# Patient Record
Sex: FEMALE | Race: WHITE | NOT HISPANIC OR LATINO | Employment: UNEMPLOYED | URBAN - METROPOLITAN AREA
[De-identification: names, ages, dates, MRNs, and addresses within clinical notes are randomized per-mention and may not be internally consistent; named-entity substitution may affect disease eponyms.]

---

## 2017-06-14 ENCOUNTER — OFFICE VISIT (OUTPATIENT)
Dept: MEDICAL GROUP | Facility: PHYSICIAN GROUP | Age: 47
End: 2017-06-14
Payer: COMMERCIAL

## 2017-06-14 VITALS
RESPIRATION RATE: 20 BRPM | OXYGEN SATURATION: 99 % | HEART RATE: 60 BPM | DIASTOLIC BLOOD PRESSURE: 84 MMHG | WEIGHT: 118 LBS | HEIGHT: 65 IN | BODY MASS INDEX: 19.66 KG/M2 | SYSTOLIC BLOOD PRESSURE: 122 MMHG | TEMPERATURE: 97.6 F

## 2017-06-14 DIAGNOSIS — G25.0 BENIGN HEAD TREMOR: ICD-10-CM

## 2017-06-14 DIAGNOSIS — F41.9 ANXIETY: ICD-10-CM

## 2017-06-14 DIAGNOSIS — Z13.220 SCREENING, LIPID: ICD-10-CM

## 2017-06-14 DIAGNOSIS — R51.9 HEADACHE, UNSPECIFIED HEADACHE TYPE: ICD-10-CM

## 2017-06-14 DIAGNOSIS — Z76.89 ENCOUNTER TO ESTABLISH CARE WITH NEW DOCTOR: ICD-10-CM

## 2017-06-14 DIAGNOSIS — R53.83 FATIGUE, UNSPECIFIED TYPE: ICD-10-CM

## 2017-06-14 DIAGNOSIS — Z12.39 BREAST CANCER SCREENING: ICD-10-CM

## 2017-06-14 DIAGNOSIS — A69.20 LYME DISEASE: ICD-10-CM

## 2017-06-14 PROCEDURE — 99204 OFFICE O/P NEW MOD 45 MIN: CPT | Performed by: NURSE PRACTITIONER

## 2017-06-14 ASSESSMENT — PATIENT HEALTH QUESTIONNAIRE - PHQ9: CLINICAL INTERPRETATION OF PHQ2 SCORE: 0

## 2017-06-14 NOTE — Clinical Note
[facility]  IHSAN Cardona.  [address]  [place] [address]  Fax: [phone]   Authorization for Release/Disclosure of   Protected Health Information   Name: KAIA MEREDITH : 1970 SSN: [id number]   Address: [address] [place] [address]   [place] [address] Phone:    [phone] (home)    I authorize the entity listed below to release/disclose the PHI below to:   [facility]/CLARISSE Cardona and CLARISSE Cardona   Provider or Entity Name:     Address   City, State, Zip   Phone:      Fax:     Reason for request: continuity of care   Information to be released:    [  ] LAST COLONOSCOPY,  including any PATH REPORT and follow-up  [  ] LAST FIT/COLOGUARD RESULT [  ] LAST DEXA  [  ] LAST MAMMOGRAM  [  ] LAST PAP  [  ] LAST LABS [  ] RETINA EXAM REPORT  [  ] IMMUNIZATION RECORDS  [x] Release all info      [  ] Check here and initial the line next to each item to release ALL health information INCLUDING  _____ Care and treatment for drug and / or alcohol abuse  _____ HIV testing, infection status, or AIDS  _____ Genetic Testing    DATES OF SERVICE OR TIME PERIOD TO BE DISCLOSED: _____________  I understand and acknowledge that:  * This Authorization may be revoked at any time by you in writing, except if your health information has already been used or disclosed.  * Your health information that will be used or disclosed as a result of you signing this authorization could be re-disclosed by the recipient. If this occurs, your re-disclosed health information may no longer be protected by State or Federal laws.  * You may refuse to sign this Authorization. Your refusal will not affect your ability to obtain treatment.  * This Authorization becomes effective upon signing and will  on (date) __________.      If no date is indicated, this Authorization will  one (1) year from the signature date.    Name: Kaia Meredith    Signature:   Date:     2017       PLEASE FAX  REQUESTED RECORDS BACK TO: [phone]

## 2017-06-14 NOTE — MR AVS SNAPSHOT
"        Kaia Larson   2017 9:00 AM   Office Visit   MRN: [id number]    Department:  [facility]   Dept Phone:  [phone]    Description:  Female : 1970   Provider:  CLARISSE Cardona           Reason for Visit     Establish Care           Allergies as of 2017     No Known Allergies      You were diagnosed with     Encounter to establish care with new doctor   [173640]       Lyme disease   [088.81.ICD-9-CM]       Fatigue, unspecified type   [5941409]       Headache, unspecified headache type   [4630058]       Screening, lipid   [880889]       Breast cancer screening   [005517]       Benign head tremor   [890937]       Anxiety   [205101]         Vital Signs     Blood Pressure Pulse Temperature Respirations Height Weight    122/84 mmHg 60 36.4 °C (97.6 °F) 20 1.651 m (5' 5\") 53.524 kg (118 lb)    Body Mass Index Oxygen Saturation Last Menstrual Period Breastfeeding? Smoking Status       19.64 kg/m2 99% 2017 No Former Smoker       Basic Information     Date Of Birth Sex Race Ethnicity Preferred Language    1970 Female White Non- English      Problem List              ICD-10-CM Priority Class Noted - Resolved    Encounter to establish care with new doctor Z71.89   2017 - Present    Lyme disease A69.20   2017 - Present    Benign head tremor G25.0   2017 - Present    Screening, lipid Z13.220   2017 - Present    Anxiety F41.9   2017 - Present      Health Maintenance        Date Due Completion Dates    IMM DTaP/Tdap/Td Vaccine (1 - Tdap) 1989 ---    PAP SMEAR 1991 ---    MAMMOGRAM 2010 ---            Current Immunizations     No immunizations on file.      Below and/or attached are the medications your provider expects you to take. Review all of your home medications and newly ordered medications with your provider and/or pharmacist. Follow medication instructions as directed by your provider and/or pharmacist. Please keep your " medication list with you and share with your provider. Update the information when medications are discontinued, doses are changed, or new medications (including over-the-counter products) are added; and carry medication information at all times in the event of emergency situations     Allergies:  No Known Allergies          Medications  Valid as of: June 14, 2017 -  9:30 AM    Generic Name Brand Name Tablet Size Instructions for use    .                 Medicines prescribed today were sent to:     [facility] [id number] [place] [address], [place] - [address] [place] [address]    Phone: [phone] Fax: [phone]    Open 24 Hours?: No      Medication refill instructions:       If your prescription bottle indicates you have medication refills left, it is not necessary to call your provider’s office. Please contact your pharmacy and they will refill your medication.    If your prescription bottle indicates you do not have any refills left, you may request refills at any time through one of the following ways: The online [employer] system (except Urgent Care), by calling your provider’s office, or by asking your pharmacy to contact your provider’s office with a refill request. Medication refills are processed only during regular business hours and may not be available until the next business day. Your provider may request additional information or to have a follow-up visit with you prior to refilling your medication.   *Please Note: Medication refills are assigned a new Rx number when refilled electronically. Your pharmacy may indicate that no refills were authorized even though a new prescription for the same medication is available at the pharmacy. Please request the medicine by name with the pharmacy before contacting your provider for a refill.        Your To Do List     Future Labs/Procedures Complete By Expires    [facility] WITH DIFFERENTIAL  As directed 6/14/2018     COMP METABOLIC PANEL  As directed 6/14/2018    FREE THYROXINE  As directed 6/14/2018    LIPID PROFILE  As directed 6/14/2018    LYME WESTERN BLOT IGG + IGM  As directed 6/14/2018    MA-SCREEN MAMMO W/CAD-BILAT  As directed 6/14/2018    TRIIDOTHYRONINE  As directed 6/14/2018    TSH  As directed 6/14/2018    VITAMIN D,25 HYDROXY  As directed 6/14/2018      Referral     A referral request has been sent to our patient care coordination department. Please allow 3-5 business days for us to process this request and contact you either by phone or mail. If you do not hear from us by the 5th business day, please call us at [phone].           Hand Talk Access Code: Activation code not generated  Current Hand Talk Status: Active

## 2017-06-14 NOTE — ASSESSMENT & PLAN NOTE
Has intermittent issues with anxiety.  Has taken low dose zoloft in the past.  Uses deep breathing and mindfulness to control symptoms.

## 2017-06-14 NOTE — ASSESSMENT & PLAN NOTE
Has noticed that she has developed a mild head tremor over the past several years.  She states that it is most noticeable when she is trying to read or focus on something.  She does not recall any family members having a tremor.  It bothers her, especially when other people point it out to her.  Discussed options. She would like to proceed with a referral to neurology for evaluation (and peace of mind).

## 2017-06-14 NOTE — ASSESSMENT & PLAN NOTE
Is here to establish with a new primary care provider.  Was previously seen by a provider in [place].  Moved to [facility] last year.

## 2017-06-14 NOTE — PROGRESS NOTES
Chief Complaint   Patient presents with   • Establish Care       HISTORY OF PRESENT ILLNESS: Patient is a 47 y.o. female new patient who presents today to discuss the following issues:    Encounter to establish care with new doctor  Is here to establish with a new primary care provider.  Was previously seen by a provider in [place].  Moved to [facility] last year.      Anxiety  Has intermittent issues with anxiety.  Has taken low dose zoloft in the past.  Uses deep breathing and mindfulness to control symptoms.    Benign head tremor  Has noticed that she has developed a mild head tremor over the past several years.  She states that it is most noticeable when she is trying to read or focus on something.  She does not recall any family members having a tremor.  It bothers her, especially when other people point it out to her.  Discussed options. She would like to proceed with a referral to neurology for evaluation (and peace of mind).    Lyme disease  Has a history of Lyme Disease.  Had a tick bite on her left upper thigh and developed a rash that spiraled down her leg.  She was treated for it, and has since tested positive 2 more times.  She reports that she has occasional aches in her groin, and occasional headaches.  Otherwise she feels fine.    Breast cancer screening  She is due for a screening mammogram.  She has only had one, and never went back because she has small breasts and she didn't have a good experience.  She was encouraged to follow-through with annual mammograms given her family history (mother) of breast cancer.      Patient Active Problem List    Diagnosis Date Noted   • Encounter to establish care with new doctor 06/14/2017   • Lyme disease 06/14/2017   • Benign head tremor 06/14/2017   • Screening, lipid 06/14/2017   • Anxiety 06/14/2017   • Breast cancer screening 06/14/2017       Allergies:Review of patient's allergies indicates no known allergies.    No current outpatient prescriptions  "on file.     No current facility-administered medications for this visit.       Social History   Substance Use Topics   • Smoking status: Former Smoker   • Smokeless tobacco: Never Used      Comment: social smoker in the 90s   • Alcohol Use: 2.4 oz/week     4 Glasses of wine per week       No family status information on file.     Family History   Problem Relation Age of Onset   • Heart Attack Mother    • Cancer Mother 50     breast   • Diabetes Mother    • Depression Mother        Review of Systems:   Constitutional: Negative for fever, chills, weight loss and malaise/fatigue.   HENT: Negative for ear pain, nosebleeds, congestion, sore throat and neck pain.  Positive for mild, intermittent head tremor.  Eyes: Negative for blurred vision.   Respiratory: Negative for cough, sputum production, shortness of breath and wheezing.    Cardiovascular: Negative for chest pain, palpitations, orthopnea and leg swelling.   Gastrointestinal: Negative for heartburn, nausea, vomiting and abdominal pain.   Genitourinary: Negative for dysuria, urgency and frequency.   Musculoskeletal: Negative for myalgias, joint pain, and back pain.  Positive for occasional groin pain.  Skin: Negative for rash and itching.   Neurological: Negative for dizziness, tingling, tremors, sensory change, and focal weakness.  Positive for occasional headaches.   Endo/Heme/Allergies: Does not bruise/bleed easily.   Psychiatric/Behavioral: Negative for depression, suicidal ideas and memory loss.  Positive for mild anxiety.  All other systems reviewed and are negative except as in HPI.    Exam:  Blood pressure 122/84, pulse 60, temperature 36.4 °C (97.6 °F), resp. rate 20, height 1.651 m (5' 5\"), weight 53.524 kg (118 lb), last menstrual period 06/01/2017, SpO2 99 %, not currently breastfeeding.  General:  Well nourished, well developed female in NAD  Head: Grossly normal.  Neck: Supple without JVD or bruit. Thyroid is not enlarged.  Pulmonary: Clear to " ausculation. Normal effort. No rales, ronchi, or wheezing.  Cardiovascular: Regular rate and rhythm without murmur.   Extremities: No clubbing, cyanosis, or edema.  Skin: Intact with no obvious rashes or lesions.  Neuro: Grossly intact.  Psych: Alert and oriented x 3.  Mood and affect appropriate.    Medical decision-making and discussion: Kaia is here to establish with a new primary care provider.  We reviewed her past medical history and discussed her current medications.  Lab work and screening mammogram were ordered, and a referral was sent to neurology.  She will sign a records release for her previous provider, she will sign up with [facility], and she will plan to follow-up here as needed.         Assessment/Plan:  1. Encounter to establish care with new doctor     2. Lyme disease  LYME WESTERN BLOT IGG + IGM   3. Fatigue, unspecified type  CBC WITH DIFFERENTIAL    COMP METABOLIC PANEL    TSH    VITAMIN D,25 HYDROXY    TRIIDOTHYRONINE    FREE THYROXINE    LYME WESTERN BLOT IGG + IGM   4. Headache, unspecified headache type  CBC WITH DIFFERENTIAL    COMP METABOLIC PANEL    VITAMIN D,25 HYDROXY    LYME WESTERN BLOT IGG + IGM   5. Screening, lipid  LIPID PROFILE   6. Breast cancer screening  MA-SCREEN MAMMO W/CAD-BILAT   7. Benign head tremor  REFERRAL TO NEUROLOGY   8. Anxiety         Return if symptoms worsen or fail to improve.    Please note that this dictation was created using voice recognition software. I have made every reasonable attempt to correct obvious errors, but I expect that there are errors of grammar and possibly content that I did not discover before finalizing the note.

## 2017-06-14 NOTE — ASSESSMENT & PLAN NOTE
She is due for a screening mammogram.  She has only had one, and never went back because she has small breasts and she didn't have a good experience.  She was encouraged to follow-through with annual mammograms given her family history (mother) of breast cancer.

## 2017-06-14 NOTE — ASSESSMENT & PLAN NOTE
Has a history of Lyme Disease.  Had a tick bite on her left upper thigh and developed a rash that spiraled down her leg.  She was treated for it, and has since tested positive 2 more times.  She reports that she has occasional aches in her groin, and occasional headaches.  Otherwise she feels fine.

## 2017-07-03 ENCOUNTER — HOSPITAL ENCOUNTER (OUTPATIENT)
Dept: LAB | Facility: MEDICAL CENTER | Age: 47
End: 2017-07-03
Attending: NURSE PRACTITIONER
Payer: COMMERCIAL

## 2017-07-03 DIAGNOSIS — Z13.220 SCREENING, LIPID: ICD-10-CM

## 2017-07-03 DIAGNOSIS — R51.9 HEADACHE, UNSPECIFIED HEADACHE TYPE: ICD-10-CM

## 2017-07-03 DIAGNOSIS — A69.20 LYME DISEASE: ICD-10-CM

## 2017-07-03 DIAGNOSIS — R53.83 FATIGUE, UNSPECIFIED TYPE: ICD-10-CM

## 2017-07-03 LAB
25(OH)D3 SERPL-MCNC: 40 NG/ML (ref 30–100)
ALBUMIN SERPL BCP-MCNC: 4 G/DL (ref 3.2–4.9)
ALBUMIN/GLOB SERPL: 1.2 G/DL
ALP SERPL-CCNC: 47 U/L (ref 30–99)
ALT SERPL-CCNC: 16 U/L (ref 2–50)
ANION GAP SERPL CALC-SCNC: 6 MMOL/L (ref 0–11.9)
AST SERPL-CCNC: 20 U/L (ref 12–45)
BASOPHILS # BLD AUTO: 0.3 % (ref 0–1.8)
BASOPHILS # BLD: 0.01 K/UL (ref 0–0.12)
BILIRUB SERPL-MCNC: 1 MG/DL (ref 0.1–1.5)
BUN SERPL-MCNC: 20 MG/DL (ref 8–22)
CALCIUM SERPL-MCNC: 9.3 MG/DL (ref 8.5–10.5)
CHLORIDE SERPL-SCNC: 106 MMOL/L (ref 96–112)
CHOLEST SERPL-MCNC: 218 MG/DL (ref 100–199)
CO2 SERPL-SCNC: 27 MMOL/L (ref 20–33)
CREAT SERPL-MCNC: 0.7 MG/DL (ref 0.5–1.4)
EOSINOPHIL # BLD AUTO: 0.07 K/UL (ref 0–0.51)
EOSINOPHIL NFR BLD: 2.4 % (ref 0–6.9)
ERYTHROCYTE [DISTWIDTH] IN BLOOD BY AUTOMATED COUNT: 44.2 FL (ref 35.9–50)
GFR SERPL CREATININE-BSD FRML MDRD: >60 ML/MIN/1.73 M 2
GLOBULIN SER CALC-MCNC: 3.4 G/DL (ref 1.9–3.5)
GLUCOSE SERPL-MCNC: 86 MG/DL (ref 65–99)
HCT VFR BLD AUTO: 41.9 % (ref 37–47)
HDLC SERPL-MCNC: 65 MG/DL
HGB BLD-MCNC: 13.8 G/DL (ref 12–16)
IMM GRANULOCYTES # BLD AUTO: 0 K/UL (ref 0–0.11)
IMM GRANULOCYTES NFR BLD AUTO: 0 % (ref 0–0.9)
LDLC SERPL CALC-MCNC: 138 MG/DL
LYMPHOCYTES # BLD AUTO: 0.87 K/UL (ref 1–4.8)
LYMPHOCYTES NFR BLD: 29.3 % (ref 22–41)
MCH RBC QN AUTO: 30.9 PG (ref 27–33)
MCHC RBC AUTO-ENTMCNC: 32.9 G/DL (ref 33.6–35)
MCV RBC AUTO: 93.7 FL (ref 81.4–97.8)
MONOCYTES # BLD AUTO: 0.3 K/UL (ref 0–0.85)
MONOCYTES NFR BLD AUTO: 10.1 % (ref 0–13.4)
NEUTROPHILS # BLD AUTO: 1.72 K/UL (ref 2–7.15)
NEUTROPHILS NFR BLD: 57.9 % (ref 44–72)
NRBC # BLD AUTO: 0 K/UL
NRBC BLD AUTO-RTO: 0 /100 WBC
PLATELET # BLD AUTO: 197 K/UL (ref 164–446)
PMV BLD AUTO: 13.1 FL (ref 9–12.9)
POTASSIUM SERPL-SCNC: 4.1 MMOL/L (ref 3.6–5.5)
PROT SERPL-MCNC: 7.4 G/DL (ref 6–8.2)
RBC # BLD AUTO: 4.47 M/UL (ref 4.2–5.4)
SODIUM SERPL-SCNC: 139 MMOL/L (ref 135–145)
T3 SERPL-MCNC: 94.2 NG/DL (ref 60–181)
T4 FREE SERPL-MCNC: 0.82 NG/DL (ref 0.53–1.43)
TRIGL SERPL-MCNC: 75 MG/DL (ref 0–149)
TSH SERPL DL<=0.005 MIU/L-ACNC: 3.76 UIU/ML (ref 0.3–3.7)
WBC # BLD AUTO: 3 K/UL (ref 4.8–10.8)

## 2017-07-03 PROCEDURE — 84443 ASSAY THYROID STIM HORMONE: CPT

## 2017-07-03 PROCEDURE — 82306 VITAMIN D 25 HYDROXY: CPT

## 2017-07-03 PROCEDURE — 86617 LYME DISEASE ANTIBODY: CPT | Mod: 91

## 2017-07-03 PROCEDURE — 80053 COMPREHEN METABOLIC PANEL: CPT

## 2017-07-03 PROCEDURE — 85025 COMPLETE CBC W/AUTO DIFF WBC: CPT

## 2017-07-03 PROCEDURE — 84439 ASSAY OF FREE THYROXINE: CPT

## 2017-07-03 PROCEDURE — 80061 LIPID PANEL: CPT

## 2017-07-03 PROCEDURE — 36415 COLL VENOUS BLD VENIPUNCTURE: CPT

## 2017-07-03 PROCEDURE — 84480 ASSAY TRIIODOTHYRONINE (T3): CPT

## 2017-07-06 LAB
B BURGDOR IGG SER QL IB: POSITIVE
B BURGDOR IGM SER QL IB: POSITIVE

## 2017-10-17 ENCOUNTER — APPOINTMENT (OUTPATIENT)
Dept: NEUROLOGY | Facility: MEDICAL CENTER | Age: 47
End: 2017-10-17
Payer: COMMERCIAL

## 2018-02-01 ENCOUNTER — OFFICE VISIT (OUTPATIENT)
Dept: NEUROLOGY | Facility: MEDICAL CENTER | Age: 48
End: 2018-02-01
Payer: COMMERCIAL

## 2018-02-01 VITALS
OXYGEN SATURATION: 95 % | HEART RATE: 108 BPM | TEMPERATURE: 98.5 F | RESPIRATION RATE: 16 BRPM | WEIGHT: 111.8 LBS | HEIGHT: 65 IN | BODY MASS INDEX: 18.63 KG/M2 | SYSTOLIC BLOOD PRESSURE: 90 MMHG | DIASTOLIC BLOOD PRESSURE: 62 MMHG

## 2018-02-01 DIAGNOSIS — G25.0 FAMILIAL TREMOR: Primary | ICD-10-CM

## 2018-02-01 PROCEDURE — 99205 OFFICE O/P NEW HI 60 MIN: CPT | Performed by: PSYCHIATRY & NEUROLOGY

## 2018-02-01 RX ORDER — IBUPROFEN 200 MG
200 TABLET ORAL EVERY 6 HOURS PRN
COMMUNITY
End: 2022-10-27

## 2018-02-01 ASSESSMENT — ENCOUNTER SYMPTOMS
TREMORS: 1
VOMITING: 0
CONSTIPATION: 0
FOCAL WEAKNESS: 0
NAUSEA: 0
FALLS: 0
MEMORY LOSS: 1
LOSS OF CONSCIOUSNESS: 0
DEPRESSION: 0
DOUBLE VISION: 0

## 2018-02-01 ASSESSMENT — PAIN SCALES - GENERAL: PAINLEVEL: 3=SLIGHT PAIN

## 2018-02-01 NOTE — PROGRESS NOTES
Subjective:      Kaia Larson is a 48 y.o. female who presents for consultation from the office of KARLA Fields, with a history of tremor.    CONNIE Marti is a pleasant 48-year-old left-handed  female who began to notice tremulousness of the head and neck beginning about 2-3 years ago, and which has slowly changed over time, symptoms becoming a bit more prominent. She denies any involvement of the voice, hands, or an internalized sense of tremulousness.    It is a very fine tremulousness, side to side as well as up and down, not necessarily apparent to others. When she holds the head and neck in static position such as when reading, as well as when she is more anxious or stressed, fatigue, etc., she is more likely to notice it. Relaxation always helps. Caffeine clearly makes things worse. She is not sure if alcohol has any effect one way or the other.    As above, she has not recognized any tremulousness with the hands. She certainly denies loss of voice volume and quality, anosmia, difficulty swallowing or excessive drooling, loss of dexterity with the hands, weakness, balance difficulties with frequent falls, excessive constipation, syncope or loss of appetite with early satiety and nausea, etc.    There has been no directed workup, she has not been treated for the tremulousness.    She does describe some memory problems especially with word finding, but even recalling details of movies initially, though she can recall the machines. 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    She has a history of chronic Lyme disease, anxiety with panic disorder, no history of diabetes, MS, seizure, migraine, neurodegenerative disease, liver or kidney disease, anemia, blood dyscrasia, autoimmune disease, thyroid disease, CVA, CAD, hypertension or PVD. She is status post ASD correction at 4 years of age.  "There is no other surgical history of note. Her last menses was 5 months ago. Both her mother and maternal grandmother suffered from tremor, her mother in the hands, her maternal grandmother in her head. Her mother also has heart disease, bipolar disease and symptomatic seizures. Her father passed recently from heart disease and complications of prostate cancer. Her 2 brothers are alive and well as are her 5 children, ages 5 through 16. She drinks occasionally, does not smoke cigarettes. She has her own social media management business, her  is a  working for a software copy. Other than ibuprofen, she takes no other medications.    Review of Systems   Constitutional: Negative for malaise/fatigue.   Eyes: Negative for double vision.   Gastrointestinal: Negative for constipation, nausea and vomiting.   Genitourinary: Negative for dysuria.   Musculoskeletal: Negative for falls.   Neurological: Positive for tremors. Negative for focal weakness and loss of consciousness.   Psychiatric/Behavioral: Positive for memory loss. Negative for depression.   All other systems reviewed and are negative.       Objective:     BP (!) 90/62   Pulse (!) 108   Temp 36.9 °C (98.5 °F)   Resp 16   Ht 1.651 m (5' 5\")   Wt 50.7 kg (111 lb 12.8 oz)   SpO2 95%   BMI 18.60 kg/m²      Physical Exam    She appears in no acute distress. Vital signs are stable though her blood pressure is a little on the low side at 90/62, her pulse slightly elevated 108, the rhythm is regular. There was no malar rash, sialorrhea, she is not drooling excessively. The neck is supple, range of motion is full. A tremulousness is seen when she sits erect, involving the head and a side to side motion. Cardiac evaluation reveals a regular rhythm. There is no lower extremity edema.    She is fully oriented, there is no aphasia or inattention.    PERRLA/EOMI, glabellar tap is absent, visual fields are full to movement detection, there is no " hypophonia or bradykinesia, facial movements are symmetric, there is no sensory loss across the midline to temperature and light touch. There is no bulbar dysfunction, voice volume and quality are normal without tremulousness, shoulder shrug and head rotation are intact.    Motor exam does reveal a very fine minimal tremulousness in the hands with posture against gravity only, there is no asterixis or drift. Strength is 5/5 throughout. Tone is normal with distraction. Reflexes are brisk and present at all points without asymmetries, none are dropped, both toes are downgoing.    She walks with normal station, arm swing is symmetric, heel, toe, and tandem walking are intact. Arm swing is symmetric. There is no appendicular dystaxia, fine tremulousness with the right upper extremity seen through a full range of motion. Repetitive movements with the hands, fingers and feet are intact and symmetric with normal and maintained amplitudes and frequencies.    Sensory exam is intact to temperature and vibration, Romberg is absent.     Assessment/Plan:     1. Familial tremor  I suspect that this is the diagnosis, a benign process, she certainly does not have the symptoms by history nor the abnormalities on examination to suggest Parkinson's disease or one of its relatives. This is not thyroid disease, anemia, or other symptomatic tremulousness related to toxic metabolic derangements, liver or kidney disease, etc. I don't believe that imaging studies or EEG studies are indicated. To help confirm the diagnosis, I did ask her to have a drink on a couple of different occasions to see if in fact there is improvement consistently. If so, this will confirm the diagnosis, though this will show up in only about 30% of patients.    The nature of familial, benign tremor was reviewed. There may be some anticipation given that both her mother and maternal grandmother have the condition though they started when they were older, hers is a  much earlier onset. A little unusual is the fact that it is a head and neck issue more than an appendicular involvement, but still this is not unheard of. Fortunately this is a condition that will progress only slowly over many years and decades. Symptomatic relief can be offered when needed, but for now she seems to be doing well without the need.    As for memory loss, though I doubt this is related to her chronic Lyme disease, we also talked about signs and symptoms suggestive of dementia and what to watch for. Again I think it unlikely for her given her young age without a family history. Some of the word finding problems she describes are likely benign, she denies anything else that suggests a progressive cognitive disorder.    Medications that can be used would include beta blockers, Mysoline, carbonic anhydrase inhibitors and gabapentin. We will cross that bridge when we get there. For now a follow-up appointment will be scheduled as needed, she was offered an annual appointment.    Time: Evaluation 60 minutes for exam come review, discussion, and education  Discussion: As mentioned in the assessment, over 60% of the time spent face-to-face counseling and coordinating care

## 2018-11-06 ENCOUNTER — HOSPITAL ENCOUNTER (OUTPATIENT)
Facility: MEDICAL CENTER | Age: 48
End: 2018-11-06
Attending: PHYSICIAN ASSISTANT
Payer: COMMERCIAL

## 2018-11-06 ENCOUNTER — OFFICE VISIT (OUTPATIENT)
Dept: URGENT CARE | Facility: PHYSICIAN GROUP | Age: 48
End: 2018-11-06
Payer: COMMERCIAL

## 2018-11-06 VITALS
RESPIRATION RATE: 12 BRPM | DIASTOLIC BLOOD PRESSURE: 68 MMHG | SYSTOLIC BLOOD PRESSURE: 102 MMHG | TEMPERATURE: 97.9 F | WEIGHT: 110 LBS | BODY MASS INDEX: 18.33 KG/M2 | OXYGEN SATURATION: 98 % | HEIGHT: 65 IN | HEART RATE: 69 BPM

## 2018-11-06 DIAGNOSIS — R31.29 HEMATURIA, MICROSCOPIC: ICD-10-CM

## 2018-11-06 DIAGNOSIS — N89.8 VAGINAL DISCHARGE: ICD-10-CM

## 2018-11-06 DIAGNOSIS — N89.8 VAGINAL ITCHING: ICD-10-CM

## 2018-11-06 LAB
APPEARANCE UR: NORMAL
BILIRUB UR STRIP-MCNC: NORMAL MG/DL
COLOR UR AUTO: NORMAL
GLUCOSE UR STRIP.AUTO-MCNC: NORMAL MG/DL
KETONES UR STRIP.AUTO-MCNC: NORMAL MG/DL
LEUKOCYTE ESTERASE UR QL STRIP.AUTO: NORMAL
NITRITE UR QL STRIP.AUTO: NORMAL
PH UR STRIP.AUTO: 7 [PH] (ref 5–8)
PROT UR QL STRIP: NORMAL MG/DL
RBC UR QL AUTO: NORMAL
SP GR UR STRIP.AUTO: 1.02
UROBILINOGEN UR STRIP-MCNC: 1 MG/DL

## 2018-11-06 PROCEDURE — 87591 N.GONORRHOEAE DNA AMP PROB: CPT

## 2018-11-06 PROCEDURE — 87480 CANDIDA DNA DIR PROBE: CPT

## 2018-11-06 PROCEDURE — 87510 GARDNER VAG DNA DIR PROBE: CPT

## 2018-11-06 PROCEDURE — 81002 URINALYSIS NONAUTO W/O SCOPE: CPT | Performed by: PHYSICIAN ASSISTANT

## 2018-11-06 PROCEDURE — 87660 TRICHOMONAS VAGIN DIR PROBE: CPT

## 2018-11-06 PROCEDURE — 99203 OFFICE O/P NEW LOW 30 MIN: CPT | Performed by: PHYSICIAN ASSISTANT

## 2018-11-06 PROCEDURE — 87491 CHLMYD TRACH DNA AMP PROBE: CPT

## 2018-11-06 ASSESSMENT — ENCOUNTER SYMPTOMS
CHILLS: 0
FLANK PAIN: 1
NAUSEA: 0
ABDOMINAL PAIN: 0
VAGINITIS: 1

## 2018-11-06 NOTE — PROGRESS NOTES
"Subjective:     Kaia Larson is a 48 y.o. female who presents for Vaginal Discharge (x 8 days )       Vaginitis   The patient's primary symptoms include genital itching and vaginal discharge. The patient's pertinent negatives include no genital odor or vaginal bleeding. Primary symptoms comment: denies hematuria. This is a new problem. The current episode started in the past 7 days. The problem occurs constantly. The problem has been unchanged. The patient is experiencing no pain. The problem affects both sides. She is not pregnant. Associated symptoms include flank pain. Pertinent negatives include no abdominal pain, chills, nausea or rash. The vaginal discharge was copious. There has been no bleeding. She has not been passing clots. Nothing aggravates the symptoms. Treatments tried: OTC yeast infection tx (1 day) The treatment provided no relief. She is sexually active (Pt has sex for the first time in several months the day prior to sx onset. monogamous relationship with .). No, her partner does not have an STD. She uses nothing for contraception. She is postmenopausal. (Genital warts as a teenager. )   LMP:2017    Review of Systems   Constitutional: Negative for chills.   Gastrointestinal: Negative for abdominal pain and nausea.   Genitourinary: Positive for flank pain and vaginal discharge.   Skin: Negative for rash.   All other systems reviewed and are negative.  No Known Allergies   Objective:   /68 (BP Location: Right arm, Patient Position: Sitting, BP Cuff Size: Adult)   Pulse 69   Temp 36.6 °C (97.9 °F)   Resp 12   Ht 1.651 m (5' 5\")   Wt 49.9 kg (110 lb)   LMP 2017   SpO2 98%   BMI 18.30 kg/m²   Physical Exam   Genitourinary:       Pelvic exam was performed with patient supine. There is no rash, tenderness, lesion or injury on the right labia. There is no rash, tenderness, lesion or injury on the left labia. Cervix exhibits no motion tenderness, no discharge and no " friability. No erythema, tenderness or bleeding in the vagina. No signs of injury around the vagina. Vaginal discharge found.   Genitourinary Comments: Bimanual exam not performed, as pt has well woman exam scheduled on Friday.        Assessment/Plan:   1. Hematuria, microscopic  - URINALYSIS; Standing    2. Vaginal discharge  - CHLAMYDIA/GC PCR URINE OR SWAB; Standing  - ALLERGEN, TRICHOPHYTON IGG, FUNGI/MOLD; Standing  - CHLAMYDIA/GC PCR URINE OR SWAB  - ALLERGEN, TRICHOPHYTON IGG, FUNGI/MOLD    Suspicious for yeast infection or BV. We will contact patient with results upon receipt and treat as indicated.    Differential diagnosis, natural history, supportive care, and indications for immediate follow-up discussed.    Pt contacted the clinic 11.7.18 due to extreme discomfort. I returned call at 1531. Pt presentation most consistent with a yeast infection. R/B of treating clinically discussed with pt and she consented to treatment.  I will Rx diflucan and adjust treatment, as required upon receipt of lab results.  Pt given return precautions.    I have reviewed and agree with history, assessment and plan for office encounter on 11/6/2018 with midlevel provider: Radha.  Suggested changes to plan or follow-up: none   Vitaly Lord M.D.

## 2018-11-07 ENCOUNTER — TELEPHONE (OUTPATIENT)
Dept: URGENT CARE | Facility: PHYSICIAN GROUP | Age: 48
End: 2018-11-07

## 2018-11-07 DIAGNOSIS — N89.8 VAGINAL DISCHARGE: ICD-10-CM

## 2018-11-07 LAB
CANDIDA DNA VAG QL PROBE+SIG AMP: NEGATIVE
G VAGINALIS DNA VAG QL PROBE+SIG AMP: NEGATIVE
T VAGINALIS DNA VAG QL PROBE+SIG AMP: NEGATIVE

## 2018-11-07 RX ORDER — FLUCONAZOLE 150 MG/1
150 TABLET ORAL DAILY
Qty: 1 TAB | Refills: 1 | Status: SHIPPED | OUTPATIENT
Start: 2018-11-07 | End: 2018-11-08

## 2018-11-09 ENCOUNTER — HOSPITAL ENCOUNTER (OUTPATIENT)
Facility: MEDICAL CENTER | Age: 48
End: 2018-11-09
Attending: FAMILY MEDICINE
Payer: COMMERCIAL

## 2018-11-09 ENCOUNTER — OFFICE VISIT (OUTPATIENT)
Dept: MEDICAL GROUP | Facility: PHYSICIAN GROUP | Age: 48
End: 2018-11-09
Payer: COMMERCIAL

## 2018-11-09 VITALS — RESPIRATION RATE: 12 BRPM | SYSTOLIC BLOOD PRESSURE: 112 MMHG | DIASTOLIC BLOOD PRESSURE: 68 MMHG | HEART RATE: 68 BPM

## 2018-11-09 DIAGNOSIS — Z12.39 SCREENING FOR BREAST CANCER: ICD-10-CM

## 2018-11-09 DIAGNOSIS — Z12.4 SCREENING FOR CERVICAL CANCER: ICD-10-CM

## 2018-11-09 DIAGNOSIS — N89.8 VAGINAL DISCHARGE: ICD-10-CM

## 2018-11-09 LAB
C TRACH DNA SPEC QL NAA+PROBE: NEGATIVE
N GONORRHOEA DNA SPEC QL NAA+PROBE: NEGATIVE
SPECIMEN SOURCE: NORMAL

## 2018-11-09 PROCEDURE — 99214 OFFICE O/P EST MOD 30 MIN: CPT | Performed by: FAMILY MEDICINE

## 2018-11-09 PROCEDURE — 87624 HPV HI-RISK TYP POOLED RSLT: CPT

## 2018-11-09 PROCEDURE — 88175 CYTOPATH C/V AUTO FLUID REDO: CPT

## 2018-11-09 RX ORDER — METRONIDAZOLE 7.5 MG/G
1 GEL VAGINAL
Qty: 1 TUBE | Refills: 1 | Status: SHIPPED | OUTPATIENT
Start: 2018-11-09 | End: 2018-11-14

## 2018-11-09 NOTE — PROGRESS NOTES
Chief Complaint   Patient presents with   • Vaginal Discharge     x 2 weeks       HISTORY OF PRESENT ILLNESS: Patient is a 48 y.o. female established patient here today for the following concerns:    1. Vaginal discharge  Here for 2 weeks of vaginal discharge that initially she mistook for urine.  However found that it had been worsening and required tampon to help with managing the discharge.  She reports it is tender, itching and burning in nature.  Had hx of HSV in teens and had hx of genital warts but has not had any exacerbations in decades.    No fevers, chills. No flank pains.           Past Medical, Social, and Family history reviewed and updated in EPIC    Allergies:Patient has no known allergies.    Current Outpatient Prescriptions   Medication Sig Dispense Refill   • metroNIDAZOLE (METROGEL-VAGINAL) 0.75 % Gel Insert 1 Applicator in vagina every bedtime for 5 days. 1 Tube 1   • ibuprofen (MOTRIN) 200 MG Tab Take 200 mg by mouth every 6 hours as needed.       No current facility-administered medications for this visit.          ROS:  Review of Systems   Constitutional: Negative for fever, chills, weight loss and malaise/fatigue.   HENT: Negative for ear pain, nosebleeds, congestion, sore throat and neck pain.    Eyes: Negative for blurred vision.   Respiratory: Negative for cough, sputum production, shortness of breath and wheezing.    Cardiovascular: Negative for chest pain, palpitations,  and leg swelling.   Gastrointestinal: Negative for heartburn, nausea, vomiting, diarrhea and abdominal pain.   Genitourinary: Negative for dysuria, urgency and frequency.   Musculoskeletal: Negative for myalgias, back pain and joint pain.   Skin: Negative for rash and itching.   Neurological: Negative for dizziness, tingling, tremors, sensory change, focal weakness and headaches.   Endo/Heme/Allergies: Does not bruise/bleed easily.   Psychiatric/Behavioral: Negative for depression, anxiety, suicidal ideas, insomnia and  memory loss.      Exam:  Blood pressure 112/68, pulse 68, resp. rate 12, last menstrual period 06/01/2017, not currently breastfeeding.    General:  Well nourished, well developed in NAD  Head is grossly normal.  Neck: Supple without JVD   Pulmonary:  Normal effort.   Cardiovascular: Regular rate  Extremities: no clubbing, cyanosis, or edema.  Psych: affect appropriate  : no external lesions, Speculum exam shows diffuse erythema of the vaginal mucosa and copious clear/yellow discharge. Cervix with possible small ulceration seen.   Pap is obtained.      Review of most recent vaginal pathogen testing.     Please note that this dictation was created using voice recognition software. I have made every reasonable attempt to correct obvious errors, but I expect that there are errors of grammar and possibly content that I did not discover before finalizing the note.    Assessment/Plan:  1. Vaginal discharge  Suspect that this may be BV given hx and exam will empirically treat with metrogel.  If not improving, query if there is more of a autoimmune component.  She definitely has hx of menopausal vaginal dryness and could benefit from vaginal estrogen when healed.   - metroNIDAZOLE (METROGEL-VAGINAL) 0.75 % Gel; Insert 1 Applicator in vagina every bedtime for 5 days.  Dispense: 1 Tube; Refill: 1    2. Screening for cervical cancer  Check   - THINPREP PAP WITH HPV; Future    3. Screening for breast cancer  Due   - MA-SCREENING MAMMO BILAT W/TOMOSYNTHESIS W/CAD; Future

## 2018-11-13 ENCOUNTER — TELEPHONE (OUTPATIENT)
Dept: MEDICAL GROUP | Facility: PHYSICIAN GROUP | Age: 48
End: 2018-11-13

## 2018-11-13 PROBLEM — Z76.89 ENCOUNTER TO ESTABLISH CARE WITH NEW DOCTOR: Status: RESOLVED | Noted: 2017-06-14 | Resolved: 2018-11-13

## 2018-11-13 PROBLEM — Z13.220 SCREENING, LIPID: Status: RESOLVED | Noted: 2017-06-14 | Resolved: 2018-11-13

## 2018-11-13 PROBLEM — Z12.39 BREAST CANCER SCREENING: Status: RESOLVED | Noted: 2017-06-14 | Resolved: 2018-11-13

## 2018-11-13 LAB
CYTOLOGY REG CYTOL: NORMAL
HPV HR 12 DNA CVX QL NAA+PROBE: NEGATIVE
HPV16 DNA SPEC QL NAA+PROBE: NEGATIVE
HPV18 DNA SPEC QL NAA+PROBE: NEGATIVE
SPECIMEN SOURCE: NORMAL

## 2018-11-13 NOTE — TELEPHONE ENCOUNTER
1. Caller Name: Kaia                                          Call Back Number: [phone] (home)        Patient approves a detailed voicemail message: N\A    pt called stating that her vaginal symptoms are not improving at all. Pt states she does have a [facility] appt scheduled but would like to know if you would like to see her sooner. Please advise

## 2018-11-14 NOTE — TELEPHONE ENCOUNTER
If she continues to have symptoms, she probably doesn't want to wait until December 5th.  I know I am pretty booked, but she can go back to urgent care or she can see another [facility] provider.  Thanks.

## 2018-12-04 ENCOUNTER — HOSPITAL ENCOUNTER (OUTPATIENT)
Dept: RADIOLOGY | Facility: MEDICAL CENTER | Age: 48
End: 2018-12-04
Attending: FAMILY MEDICINE
Payer: COMMERCIAL

## 2018-12-04 DIAGNOSIS — Z12.39 SCREENING FOR BREAST CANCER: ICD-10-CM

## 2018-12-04 PROCEDURE — 77067 SCR MAMMO BI INCL CAD: CPT

## 2019-01-03 ENCOUNTER — HOSPITAL ENCOUNTER (OUTPATIENT)
Dept: LAB | Facility: MEDICAL CENTER | Age: 49
End: 2019-01-03
Attending: OBSTETRICS & GYNECOLOGY
Payer: COMMERCIAL

## 2019-01-03 PROCEDURE — 87077 CULTURE AEROBIC IDENTIFY: CPT

## 2019-01-03 PROCEDURE — 81001 URINALYSIS AUTO W/SCOPE: CPT

## 2019-01-03 PROCEDURE — 87086 URINE CULTURE/COLONY COUNT: CPT

## 2019-01-04 LAB
APPEARANCE UR: ABNORMAL
BACTERIA #/AREA URNS HPF: NEGATIVE /HPF
BILIRUB UR QL STRIP.AUTO: NEGATIVE
COLOR UR: YELLOW
EPI CELLS #/AREA URNS HPF: ABNORMAL /HPF
GLUCOSE UR STRIP.AUTO-MCNC: NEGATIVE MG/DL
HYALINE CASTS #/AREA URNS LPF: ABNORMAL /LPF
KETONES UR STRIP.AUTO-MCNC: NEGATIVE MG/DL
LEUKOCYTE ESTERASE UR QL STRIP.AUTO: NEGATIVE
MICRO URNS: ABNORMAL
NITRITE UR QL STRIP.AUTO: NEGATIVE
PH UR STRIP.AUTO: 5.5 [PH]
PROT UR QL STRIP: NEGATIVE MG/DL
RBC # URNS HPF: ABNORMAL /HPF
RBC UR QL AUTO: NEGATIVE
SP GR UR STRIP.AUTO: 1.02
UROBILINOGEN UR STRIP.AUTO-MCNC: 0.2 MG/DL
WBC #/AREA URNS HPF: ABNORMAL /HPF

## 2019-01-06 LAB
BACTERIA UR CULT: ABNORMAL
BACTERIA UR CULT: ABNORMAL
SIGNIFICANT IND 70042: ABNORMAL
SITE SITE: ABNORMAL
SOURCE SOURCE: ABNORMAL

## 2021-04-27 ENCOUNTER — IMMUNIZATION (OUTPATIENT)
Dept: FAMILY PLANNING/WOMEN'S HEALTH CLINIC | Facility: IMMUNIZATION CENTER | Age: 51
End: 2021-04-27
Payer: COMMERCIAL

## 2021-04-27 DIAGNOSIS — Z23 ENCOUNTER FOR VACCINATION: Primary | ICD-10-CM

## 2021-04-27 PROCEDURE — 0001A PFIZER SARS-COV-2 VACCINE: CPT

## 2021-04-27 PROCEDURE — 91300 PFIZER SARS-COV-2 VACCINE: CPT

## 2021-05-27 ENCOUNTER — IMMUNIZATION (OUTPATIENT)
Dept: FAMILY PLANNING/WOMEN'S HEALTH CLINIC | Facility: IMMUNIZATION CENTER | Age: 51
End: 2021-05-27
Payer: COMMERCIAL

## 2021-05-27 DIAGNOSIS — Z23 ENCOUNTER FOR VACCINATION: Primary | ICD-10-CM

## 2021-05-27 PROCEDURE — 0002A PFIZER SARS-COV-2 VACCINE: CPT

## 2021-05-27 PROCEDURE — 91300 PFIZER SARS-COV-2 VACCINE: CPT

## 2022-09-26 ENCOUNTER — OFFICE VISIT (OUTPATIENT)
Dept: MEDICAL GROUP | Facility: PHYSICIAN GROUP | Age: 52
End: 2022-09-26
Payer: COMMERCIAL

## 2022-09-26 VITALS
HEART RATE: 66 BPM | BODY MASS INDEX: 17.68 KG/M2 | RESPIRATION RATE: 16 BRPM | OXYGEN SATURATION: 100 % | DIASTOLIC BLOOD PRESSURE: 72 MMHG | HEIGHT: 66 IN | TEMPERATURE: 98.9 F | WEIGHT: 110 LBS | SYSTOLIC BLOOD PRESSURE: 114 MMHG

## 2022-09-26 DIAGNOSIS — E78.2 MIXED HYPERLIPIDEMIA: ICD-10-CM

## 2022-09-26 DIAGNOSIS — F33.41 RECURRENT MAJOR DEPRESSIVE DISORDER, IN PARTIAL REMISSION (HCC): ICD-10-CM

## 2022-09-26 DIAGNOSIS — Z23 NEED FOR VACCINATION: ICD-10-CM

## 2022-09-26 DIAGNOSIS — M25.541 ARTHRALGIA OF BOTH HANDS: ICD-10-CM

## 2022-09-26 DIAGNOSIS — M25.542 ARTHRALGIA OF BOTH HANDS: ICD-10-CM

## 2022-09-26 DIAGNOSIS — E03.9 HYPOTHYROIDISM, UNSPECIFIED TYPE: ICD-10-CM

## 2022-09-26 DIAGNOSIS — Z12.31 ENCOUNTER FOR SCREENING MAMMOGRAM FOR MALIGNANT NEOPLASM OF BREAST: ICD-10-CM

## 2022-09-26 DIAGNOSIS — Z12.11 COLON CANCER SCREENING: ICD-10-CM

## 2022-09-26 DIAGNOSIS — Z00.00 ENCOUNTER FOR MEDICAL EXAMINATION TO ESTABLISH CARE: ICD-10-CM

## 2022-09-26 PROBLEM — K90.49 DAIRY PRODUCT INTOLERANCE: Status: ACTIVE | Noted: 2022-09-26

## 2022-09-26 PROCEDURE — 90472 IMMUNIZATION ADMIN EACH ADD: CPT

## 2022-09-26 PROCEDURE — 90686 IIV4 VACC NO PRSV 0.5 ML IM: CPT

## 2022-09-26 PROCEDURE — 99204 OFFICE O/P NEW MOD 45 MIN: CPT | Mod: 25

## 2022-09-26 PROCEDURE — 90715 TDAP VACCINE 7 YRS/> IM: CPT

## 2022-09-26 PROCEDURE — 90471 IMMUNIZATION ADMIN: CPT

## 2022-09-26 ASSESSMENT — PATIENT HEALTH QUESTIONNAIRE - PHQ9: CLINICAL INTERPRETATION OF PHQ2 SCORE: 0

## 2022-09-26 NOTE — ASSESSMENT & PLAN NOTE
Patient reports her depression first started about 20 years ago. She was put on Zoloft but discontinued this after learning how to talk herself down. She not goes through situational anxiety. A few days every couple of months, she gets feelings of wanting to run away and not come back.

## 2022-09-26 NOTE — ASSESSMENT & PLAN NOTE
Patient report ssolga was first diagnosed in 2014 and treated the issue fairly immediately. She has had about 3 different cycles of symptoms. She has symptoms intermittently of fogginess, fatigue and joint pain. She reports that her joint pain is primarily in her hands and sometimes her knees and toes. The pain causes her an issue with gripping things.

## 2022-09-26 NOTE — PROGRESS NOTES
CC:    Chief Complaint   Patient presents with    [facility]    Follow-Up     arthritis        HISTORY OF THE PRESENT ILLNESS: This is a pleasant 52 y.o. female presenting today to [facility], who wishes to discuss the following problems listed below:     Lyme disease  Patient report janice was first diagnosed in 2014 and treated the issue fairly immediately. She has had about 3 different cycles of symptoms. She has symptoms intermittently of fogginess, fatigue and joint pain. She reports that her joint pain is primarily in her hands and sometimes her knees and toes. The pain causes her an issue with gripping things.     Dairy product intolerance  Patient reports she gets an upset stomach intermittently when ingesting dairy products.     Recurrent major depressive disorder, in partial remission (HCC)  Patient reports her depression first started about 20 years ago. She was put on Zoloft but discontinued this after learning how to talk herself down. She not goes through situational anxiety. A few days every couple of months, she gets feelings of wanting to run away and not come back.       Past Medical History:   Diagnosis Date    Familial tremor 6/14/2017    Lyme disease     Status post device closure of ASD        Allergies: Patient has no known allergies.    Current Outpatient Medications   Medication Sig Dispense Refill    ibuprofen (MOTRIN) 200 MG Tab Take 200 mg by mouth every 6 hours as needed.       No current facility-administered medications for this visit.       ROS:     Constitutional: Patient denies any fever, chills, night sweats, weight loss/gain, fatigue, or malaise.   Eyes: Patient denies any photophobia, eye pain, diplopia, discharge, dryness, excessive tearing, redness, or VA changes.   ENT: Patient denies any runny nose, hoarseness, sore throat, or dysphagia.   Resp: Patient denies cough, wheezing, or shortness of breath.   CV: Patient denies any chest pain, claudication, cyanosis,  "palpitations, or edema.   GI: Patient denies any constipation, nausea, vomiting, diarrhea, bloating, abdominal pain, or dyspepsia.   MSK: Patient with + joint pain, but no cramps, swelling, weakness, + stiffness, and tremor  Skin: Patient denies any color changes, skin changes, lesions, ulcerations, wounds, and pruritus, hair or nail changes.   Neuro: Patient denies any headache, numbness or tingling  Psych: Patient denies any suicidal or homicidal ideation, depression or anxiety.     Exam: /72 (BP Location: Right arm, Patient Position: Sitting, BP Cuff Size: Adult)   Pulse 66   Temp 37.2 °C (98.9 °F) (Temporal)   Resp 16   Ht 1.664 m (5' 5.5\")   Wt 49.9 kg (110 lb)   SpO2 100%  Body mass index is 18.03 kg/m².      Gen: Alert and oriented x4. Well developed, well-nourished female in no apparent distress.  Skin: Warm, dry, good turgor, no rashes in visible areas or lacerations appreciated.   Eye: EOM intact, pupils equal, round and reactive, conjunctiva clear, lids normal.  Neck: Trachea midline, no masses, no thyromegaly  Lungs: Normal effort, CTA bilaterally, no wheezes, rhonchi, or rales. No stridor or audible wheezing. Equal chest expansion.   CV: Regular rate and rhythm. No murmurs, rubs, or gallops.  GI:  Soft, non-tender abdomen with no distention.   MSK: Normal gait, moves all extremities.  Neuro: Alert and oriented x 4, non-focal exam with motor and sensory grossly intact.  Ext: No clubbing, cyanosis, edema.  Psych: Normal behavior, affect and mood.    Assessment/Plan:    52 y.o. female with the followin. Mixed hyperlipidemia  Chronic condition.  Patient needs updated lipid profile.  Previous LDL was slightly elevated at 138.  Recheck  - Lipid Profile; Future    2. Hypothyroidism, unspecified type  Chronic condition.  Previous TSH mildly elevated at 3.7.  Update TSH  - TSH WITH REFLEX TO FT4; Future    3. Arthralgia of both hands  Chronic condition, undiagnosed etiology.  Rule out " autoimmune component.  If normal, likely osteo arthritis.  - KELLIE REFLEXIVE PROFILE; Future  - CCP ANTIBODY; Future  - RHEUMATOID ARTHRITIS FACTOR; Future  - Sed Rate; Future  - CRP QUANTITIVE (NON-CARDIAC); Future    4. Recurrent major depressive disorder, in partial remission (HCC)  Chronic condition, stable.  Patient does not currently think she is at a point where she would like to go back on medications.  She is going to work on doing more things for herself    5. Colon cancer screening  - COLOGUARD (FIT DNA)    6. Encounter for medical examination to establish care  - Comp Metabolic Panel; Future  - CBC WITHOUT DIFFERENTIAL; Future  - VITAMIN D,25 HYDROXY (DEFICIENCY); Future    7. Encounter for screening mammogram for malignant neoplasm of breast  - MA-SCREENING MAMMO BILAT W/TOMOSYNTHESIS W/CAD; Future    8. Need for vaccination  - INFLUENZA VACCINE QUAD INJ (PF)  - TDAP VACCINE =>6YO IM      Follow-up: Return in about 4 weeks (around 10/24/2022) for lab follow up .    Health Maintenance: Completed    I have placed the below orders and discussed them with an approved delegating provider.  The MA is performing the below orders under the direction of Angelique Bustillo MD.    Please note that this dictation was created using voice recognition software. I have made every reasonable attempt to correct obvious errors, but I expect that there are errors of grammar and possibly content that I did not discover before finalizing the note.    Electronically signed by CARLOS Ortega on September 26, 2022

## 2022-10-06 ENCOUNTER — HOSPITAL ENCOUNTER (OUTPATIENT)
Dept: LAB | Facility: MEDICAL CENTER | Age: 52
End: 2022-10-06
Payer: COMMERCIAL

## 2022-10-06 DIAGNOSIS — E78.2 MIXED HYPERLIPIDEMIA: ICD-10-CM

## 2022-10-06 DIAGNOSIS — M25.542 ARTHRALGIA OF BOTH HANDS: ICD-10-CM

## 2022-10-06 DIAGNOSIS — E03.9 HYPOTHYROIDISM, UNSPECIFIED TYPE: ICD-10-CM

## 2022-10-06 DIAGNOSIS — M25.541 ARTHRALGIA OF BOTH HANDS: ICD-10-CM

## 2022-10-06 DIAGNOSIS — Z00.00 ENCOUNTER FOR MEDICAL EXAMINATION TO ESTABLISH CARE: ICD-10-CM

## 2022-10-06 LAB
25(OH)D3 SERPL-MCNC: 34 NG/ML (ref 30–100)
ALBUMIN SERPL BCP-MCNC: 4.2 G/DL (ref 3.2–4.9)
ALBUMIN/GLOB SERPL: 1.1 G/DL
ALP SERPL-CCNC: 67 U/L (ref 30–99)
ALT SERPL-CCNC: 12 U/L (ref 2–50)
ANION GAP SERPL CALC-SCNC: 9 MMOL/L (ref 7–16)
AST SERPL-CCNC: 21 U/L (ref 12–45)
BILIRUB SERPL-MCNC: 0.7 MG/DL (ref 0.1–1.5)
BUN SERPL-MCNC: 22 MG/DL (ref 8–22)
CALCIUM SERPL-MCNC: 9.8 MG/DL (ref 8.5–10.5)
CHLORIDE SERPL-SCNC: 102 MMOL/L (ref 96–112)
CHOLEST SERPL-MCNC: 249 MG/DL (ref 100–199)
CO2 SERPL-SCNC: 28 MMOL/L (ref 20–33)
CREAT SERPL-MCNC: 0.55 MG/DL (ref 0.5–1.4)
CRP SERPL HS-MCNC: <0.3 MG/DL (ref 0–0.75)
ERYTHROCYTE [DISTWIDTH] IN BLOOD BY AUTOMATED COUNT: 41.9 FL (ref 35.9–50)
ERYTHROCYTE [SEDIMENTATION RATE] IN BLOOD BY WESTERGREN METHOD: 9 MM/HOUR (ref 0–25)
FASTING STATUS PATIENT QL REPORTED: NORMAL
GFR SERPLBLD CREATININE-BSD FMLA CKD-EPI: 110 ML/MIN/1.73 M 2
GLOBULIN SER CALC-MCNC: 3.8 G/DL (ref 1.9–3.5)
GLUCOSE SERPL-MCNC: 88 MG/DL (ref 65–99)
HCT VFR BLD AUTO: 43.7 % (ref 37–47)
HDLC SERPL-MCNC: 61 MG/DL
HGB BLD-MCNC: 14.4 G/DL (ref 12–16)
LDLC SERPL CALC-MCNC: 170 MG/DL
MCH RBC QN AUTO: 30.4 PG (ref 27–33)
MCHC RBC AUTO-ENTMCNC: 33 G/DL (ref 33.6–35)
MCV RBC AUTO: 92.4 FL (ref 81.4–97.8)
PLATELET # BLD AUTO: 227 K/UL (ref 164–446)
PMV BLD AUTO: 11.8 FL (ref 9–12.9)
POTASSIUM SERPL-SCNC: 4.3 MMOL/L (ref 3.6–5.5)
PROT SERPL-MCNC: 8 G/DL (ref 6–8.2)
RBC # BLD AUTO: 4.73 M/UL (ref 4.2–5.4)
RHEUMATOID FACT SER IA-ACNC: 15 IU/ML (ref 0–14)
SODIUM SERPL-SCNC: 139 MMOL/L (ref 135–145)
TRIGL SERPL-MCNC: 91 MG/DL (ref 0–149)
TSH SERPL DL<=0.005 MIU/L-ACNC: 2.85 UIU/ML (ref 0.38–5.33)
WBC # BLD AUTO: 2.8 K/UL (ref 4.8–10.8)

## 2022-10-06 PROCEDURE — 85027 COMPLETE CBC AUTOMATED: CPT

## 2022-10-06 PROCEDURE — 86038 ANTINUCLEAR ANTIBODIES: CPT

## 2022-10-06 PROCEDURE — 80061 LIPID PANEL: CPT

## 2022-10-06 PROCEDURE — 36415 COLL VENOUS BLD VENIPUNCTURE: CPT

## 2022-10-06 PROCEDURE — 82306 VITAMIN D 25 HYDROXY: CPT

## 2022-10-06 PROCEDURE — 85652 RBC SED RATE AUTOMATED: CPT

## 2022-10-06 PROCEDURE — 84443 ASSAY THYROID STIM HORMONE: CPT

## 2022-10-06 PROCEDURE — 86200 CCP ANTIBODY: CPT

## 2022-10-06 PROCEDURE — 80053 COMPREHEN METABOLIC PANEL: CPT

## 2022-10-06 PROCEDURE — 86431 RHEUMATOID FACTOR QUANT: CPT

## 2022-10-06 PROCEDURE — 86140 C-REACTIVE PROTEIN: CPT

## 2022-10-08 LAB
CCP IGG SERPL-ACNC: 3 UNITS (ref 0–19)
NUCLEAR IGG SER QL IA: NORMAL

## 2022-10-27 ENCOUNTER — OFFICE VISIT (OUTPATIENT)
Dept: MEDICAL GROUP | Facility: PHYSICIAN GROUP | Age: 52
End: 2022-10-27
Payer: COMMERCIAL

## 2022-10-27 VITALS
HEART RATE: 75 BPM | WEIGHT: 112.8 LBS | SYSTOLIC BLOOD PRESSURE: 104 MMHG | BODY MASS INDEX: 18.13 KG/M2 | RESPIRATION RATE: 16 BRPM | TEMPERATURE: 98 F | DIASTOLIC BLOOD PRESSURE: 58 MMHG | OXYGEN SATURATION: 99 % | HEIGHT: 66 IN

## 2022-10-27 DIAGNOSIS — N95.2 VAGINAL ATROPHY: ICD-10-CM

## 2022-10-27 DIAGNOSIS — F33.41 RECURRENT MAJOR DEPRESSIVE DISORDER, IN PARTIAL REMISSION (HCC): ICD-10-CM

## 2022-10-27 DIAGNOSIS — R77.1 ELEVATED SERUM GLOBULIN LEVEL: ICD-10-CM

## 2022-10-27 DIAGNOSIS — D70.9 NEUTROPENIA, UNSPECIFIED TYPE (HCC): ICD-10-CM

## 2022-10-27 DIAGNOSIS — E78.00 PURE HYPERCHOLESTEROLEMIA: ICD-10-CM

## 2022-10-27 PROCEDURE — 99214 OFFICE O/P EST MOD 30 MIN: CPT

## 2022-10-27 RX ORDER — CONJUGATED ESTROGENS 0.62 MG/G
0.5 CREAM VAGINAL DAILY
Qty: 30 G | Refills: 2 | Status: SHIPPED | OUTPATIENT
Start: 2022-10-27

## 2022-10-27 ASSESSMENT — PATIENT HEALTH QUESTIONNAIRE - PHQ9
9. THOUGHTS THAT YOU WOULD BE BETTER OFF DEAD, OR OF HURTING YOURSELF: NOT AT ALL
7. TROUBLE CONCENTRATING ON THINGS, SUCH AS READING THE NEWSPAPER OR WATCHING TELEVISION: NOT AT ALL
SUM OF ALL RESPONSES TO PHQ QUESTIONS 1-9: 8
5. POOR APPETITE OR OVEREATING: NOT AT ALL
6. FEELING BAD ABOUT YOURSELF - OR THAT YOU ARE A FAILURE OR HAVE LET YOURSELF OR YOUR FAMILY DOWN: MORE THAN HALF THE DAYS
2. FEELING DOWN, DEPRESSED, IRRITABLE, OR HOPELESS: SEVERAL DAYS
8. MOVING OR SPEAKING SO SLOWLY THAT OTHER PEOPLE COULD HAVE NOTICED. OR THE OPPOSITE, BEING SO FIGETY OR RESTLESS THAT YOU HAVE BEEN MOVING AROUND A LOT MORE THAN USUAL: SEVERAL DAYS
4. FEELING TIRED OR HAVING LITTLE ENERGY: SEVERAL DAYS
SUM OF ALL RESPONSES TO PHQ9 QUESTIONS 1 AND 2: 1
1. LITTLE INTEREST OR PLEASURE IN DOING THINGS: NOT AT ALL
3. TROUBLE FALLING OR STAYING ASLEEP OR SLEEPING TOO MUCH: NEARLY EVERY DAY

## 2022-10-27 ASSESSMENT — FIBROSIS 4 INDEX: FIB4 SCORE: 1.39

## 2022-10-27 NOTE — ASSESSMENT & PLAN NOTE
Patient reports that she and her  are trying to find a hobby to do together.  However, she is also noticing that whenever she has intercourse, it can be quite painful and this is causing a drop in her libido.

## 2022-10-27 NOTE — ASSESSMENT & PLAN NOTE
Patient presents to review labs.  She does have some elevated cholesterol, however ASCVD risk score is 1.2%.

## 2022-10-27 NOTE — PROGRESS NOTES
"CC:   Chief Complaint   Patient presents with    Lab Results        HISTORY OF PRESENT ILLNESS: Patient is a 52 y.o. female established patient who presents today to discuss the following problems below:     Neutropenia (HCC)  Patient presents for follow-up on labs.  She has had the following labs as below.  She denies any recent illnesses.  White count was also low in 2017   Latest Reference Range & Units 7/3/17 07:22 10/6/22 09:13   WBC 4.8 - 10.8 K/uL 3.0 (L) 2.8 (L)   RBC 4.20 - 5.40 M/uL 4.47 4.73   Hemoglobin 12.0 - 16.0 g/dL 13.8 14.4   Hematocrit 37.0 - 47.0 % 41.9 43.7   MCV 81.4 - 97.8 fL 93.7 92.4   MCH 27.0 - 33.0 pg 30.9 30.4   MCHC 33.6 - 35.0 g/dL 32.9 (L) 33.0 (L)   RDW 35.9 - 50.0 fL 44.2 41.9   Platelet Count 164 - 446 K/uL 197 227   MPV 9.0 - 12.9 fL 13.1 (H) 11.8   (L): Data is abnormally low  (H): Data is abnormally high    Pure hypercholesterolemia  Patient presents to review labs.  She does have some elevated cholesterol, however ASCVD risk score is 1.2%.    Recurrent major depressive disorder, in partial remission (HCC)  Patient reports that she and her  are trying to find a hobby to do together.  However, she is also noticing that whenever she has intercourse, it can be quite painful and this is causing a drop in her libido.      Past Medical History:   Diagnosis Date    Familial tremor 6/14/2017    Lyme disease     Status post device closure of ASD        Allergies:Patient has no known allergies.    Review of Systems: Otherwise negative except for as stated above.      Exam: /58 (BP Location: Right arm, Patient Position: Sitting, BP Cuff Size: Adult)   Pulse 75   Temp 36.7 °C (98 °F) (Temporal)   Resp 16   Ht 1.664 m (5' 5.5\")   Wt 51.2 kg (112 lb 12.8 oz)   SpO2 99%  Body mass index is 18.49 kg/m².    Gen: Alert and oriented x4. Well developed, well-nourished female in no apparent distress.  Skin: Warm, dry, good turgor, no rashes in visible areas or lacerations " appreciated.   Eye: EOM intact, pupils equal, round and reactive, conjunctiva clear, lids normal.  MSK: Normal gait, moves all extremities.  Neuro: Alert and oriented x 4, non-focal exam with motor and sensory grossly intact.  Ext: No clubbing, cyanosis, edema.  Psych: Normal behavior, affect and mood.      Assessment/Plan:  52 y.o. female with the following -    1. Pure hypercholesterolemia  Chronic condition, stable.  No need for statin intervention.  Recommend adding flax seeds, Harsh seeds, and oatmeal to diet.  HDL is excellent    2. Neutropenia, unspecified type (HCC)  Chronic condition, stable.  Recheck CBC with differential.  Rule out copper, folate, vitamin B12 deficiency  - CBC WITH DIFFERENTIAL; Future  - COPPER, SERUM; Future  - FOLATE; Future  - VITAMIN B12; Future    3. Elevated serum globulin level  New finding, unclear etiology.  Due to low white blood cells, obtain SPEP with monoclonal urine protein.  She continues to have diffuse joint pains, primarily in the back and bilateral hands not explained by autoimmune labs.  - SPEP W/REFLEX TO ABDIAZIZ, A, G, M; Future  - Monoclonal Protein, Ur (24 hr or Random); Future    4. Vaginal atrophy  Chronic problem, new to me, not at goal.  Given vaginal estrogen cream.  Discussed with patient that I am optimistic that when intercourse is not associated with pain, she may experience somewhat of a return in her libido.  Patient is willing to try  - estrogens, conjugated (PREMARIN) 0.625 MG/GM Cream; Insert 0.5 g into the vagina every day.  Dispense: 30 g; Refill: 2      Follow-up: Return in about 4 weeks (around 11/24/2022) for labs.    Health Maintenance: Completed      Please note that this dictation was created using voice recognition software. I have made every reasonable attempt to correct obvious errors, but I expect that there are errors of grammar and possibly content that I did not discover before finalizing the note.    Electronically signed by Keny  ROXANE Espinoza-C on October 27, 2022

## 2022-10-27 NOTE — ASSESSMENT & PLAN NOTE
Patient presents for follow-up on labs.  She has had the following labs as below.  She denies any recent illnesses.  White count was also low in 2017   Latest Reference Range & Units 7/3/17 07:22 10/6/22 09:13   WBC 4.8 - 10.8 K/uL 3.0 (L) 2.8 (L)   RBC 4.20 - 5.40 M/uL 4.47 4.73   Hemoglobin 12.0 - 16.0 g/dL 13.8 14.4   Hematocrit 37.0 - 47.0 % 41.9 43.7   MCV 81.4 - 97.8 fL 93.7 92.4   MCH 27.0 - 33.0 pg 30.9 30.4   MCHC 33.6 - 35.0 g/dL 32.9 (L) 33.0 (L)   RDW 35.9 - 50.0 fL 44.2 41.9   Platelet Count 164 - 446 K/uL 197 227   MPV 9.0 - 12.9 fL 13.1 (H) 11.8   (L): Data is abnormally low  (H): Data is abnormally high

## 2022-11-30 ENCOUNTER — HOSPITAL ENCOUNTER (OUTPATIENT)
Dept: LAB | Facility: MEDICAL CENTER | Age: 52
End: 2022-11-30
Payer: COMMERCIAL

## 2022-11-30 DIAGNOSIS — D70.9 NEUTROPENIA, UNSPECIFIED TYPE (HCC): ICD-10-CM

## 2022-11-30 DIAGNOSIS — R77.1 ELEVATED SERUM GLOBULIN LEVEL: ICD-10-CM

## 2022-11-30 LAB
BASOPHILS # BLD AUTO: 0.6 % (ref 0–1.8)
BASOPHILS # BLD: 0.02 K/UL (ref 0–0.12)
EOSINOPHIL # BLD AUTO: 0.05 K/UL (ref 0–0.51)
EOSINOPHIL NFR BLD: 1.6 % (ref 0–6.9)
ERYTHROCYTE [DISTWIDTH] IN BLOOD BY AUTOMATED COUNT: 42.5 FL (ref 35.9–50)
FOLATE SERPL-MCNC: 16.3 NG/ML
HCT VFR BLD AUTO: 44.1 % (ref 37–47)
HGB BLD-MCNC: 14.5 G/DL (ref 12–16)
IMM GRANULOCYTES # BLD AUTO: 0 K/UL (ref 0–0.11)
IMM GRANULOCYTES NFR BLD AUTO: 0 % (ref 0–0.9)
LYMPHOCYTES # BLD AUTO: 0.81 K/UL (ref 1–4.8)
LYMPHOCYTES NFR BLD: 26.1 % (ref 22–41)
MCH RBC QN AUTO: 30.5 PG (ref 27–33)
MCHC RBC AUTO-ENTMCNC: 32.9 G/DL (ref 33.6–35)
MCV RBC AUTO: 92.6 FL (ref 81.4–97.8)
MONOCYTES # BLD AUTO: 0.23 K/UL (ref 0–0.85)
MONOCYTES NFR BLD AUTO: 7.4 % (ref 0–13.4)
NEUTROPHILS # BLD AUTO: 1.99 K/UL (ref 2–7.15)
NEUTROPHILS NFR BLD: 64.3 % (ref 44–72)
NRBC # BLD AUTO: 0 K/UL
NRBC BLD-RTO: 0 /100 WBC
PLATELET # BLD AUTO: 238 K/UL (ref 164–446)
PMV BLD AUTO: 11.5 FL (ref 9–12.9)
RBC # BLD AUTO: 4.76 M/UL (ref 4.2–5.4)
VIT B12 SERPL-MCNC: 267 PG/ML (ref 211–911)
WBC # BLD AUTO: 3.1 K/UL (ref 4.8–10.8)

## 2022-11-30 PROCEDURE — 84155 ASSAY OF PROTEIN SERUM: CPT

## 2022-11-30 PROCEDURE — 84156 ASSAY OF PROTEIN URINE: CPT

## 2022-11-30 PROCEDURE — 84166 PROTEIN E-PHORESIS/URINE/CSF: CPT

## 2022-11-30 PROCEDURE — 86335 IMMUNFIX E-PHORSIS/URINE/CSF: CPT

## 2022-11-30 PROCEDURE — 84165 PROTEIN E-PHORESIS SERUM: CPT

## 2022-11-30 PROCEDURE — 82525 ASSAY OF COPPER: CPT

## 2022-11-30 PROCEDURE — 82746 ASSAY OF FOLIC ACID SERUM: CPT

## 2022-11-30 PROCEDURE — 85025 COMPLETE CBC W/AUTO DIFF WBC: CPT

## 2022-11-30 PROCEDURE — 82607 VITAMIN B-12: CPT

## 2022-11-30 PROCEDURE — 36415 COLL VENOUS BLD VENIPUNCTURE: CPT

## 2022-12-02 LAB — COPPER SERPL-MCNC: 121.6 UG/DL (ref 80–155)

## 2022-12-04 LAB
ALBUMIN SERPL ELPH-MCNC: 4.51 G/DL (ref 3.75–5.01)
ALPHA1 GLOB SERPL ELPH-MCNC: 0.29 G/DL (ref 0.19–0.46)
ALPHA2 GLOB SERPL ELPH-MCNC: 0.74 G/DL (ref 0.48–1.05)
B-GLOBULIN SERPL ELPH-MCNC: 0.75 G/DL (ref 0.48–1.1)
GAMMA GLOB SERPL ELPH-MCNC: 1.4 G/DL (ref 0.62–1.51)
INTERPRETATION SERPL IFE-IMP: NORMAL
MONOCLON BAND OBS SERPL: NORMAL
MONOCLONAL PROTEIN NL11656: NORMAL G/DL
PATHOLOGY STUDY: NORMAL
PROT SERPL-MCNC: 7.7 G/DL (ref 6.3–8.2)

## 2022-12-05 ENCOUNTER — OFFICE VISIT (OUTPATIENT)
Dept: MEDICAL GROUP | Facility: PHYSICIAN GROUP | Age: 52
End: 2022-12-05
Payer: COMMERCIAL

## 2022-12-05 VITALS
HEART RATE: 96 BPM | OXYGEN SATURATION: 99 % | BODY MASS INDEX: 17.45 KG/M2 | HEIGHT: 66 IN | DIASTOLIC BLOOD PRESSURE: 72 MMHG | TEMPERATURE: 97.9 F | RESPIRATION RATE: 16 BRPM | WEIGHT: 108.6 LBS | SYSTOLIC BLOOD PRESSURE: 118 MMHG

## 2022-12-05 DIAGNOSIS — F41.9 ANXIETY: ICD-10-CM

## 2022-12-05 PROCEDURE — 96127 BRIEF EMOTIONAL/BEHAV ASSMT: CPT

## 2022-12-05 PROCEDURE — 99214 OFFICE O/P EST MOD 30 MIN: CPT

## 2022-12-05 RX ORDER — SERTRALINE HYDROCHLORIDE 25 MG/1
25 TABLET, FILM COATED ORAL DAILY
Qty: 30 TABLET | Refills: 11 | Status: SHIPPED | OUTPATIENT
Start: 2022-12-05 | End: 2023-01-04

## 2022-12-05 RX ORDER — HYDROXYZINE HYDROCHLORIDE 10 MG/1
10 TABLET, FILM COATED ORAL NIGHTLY PRN
Qty: 30 TABLET | Refills: 0 | Status: SHIPPED | OUTPATIENT
Start: 2022-12-05

## 2022-12-05 RX ORDER — BUSPIRONE HYDROCHLORIDE 10 MG/1
10 TABLET ORAL 2 TIMES DAILY PRN
Qty: 60 TABLET | Refills: 2 | Status: SHIPPED | OUTPATIENT
Start: 2022-12-05 | End: 2023-01-04

## 2022-12-05 RX ORDER — ONDANSETRON 4 MG/1
4 TABLET, FILM COATED ORAL EVERY 4 HOURS PRN
Qty: 20 TABLET | Refills: 0 | Status: SHIPPED | OUTPATIENT
Start: 2022-12-05 | End: 2022-12-25

## 2022-12-05 ASSESSMENT — ANXIETY QUESTIONNAIRES
GAD7 TOTAL SCORE: 18
5. BEING SO RESTLESS THAT IT IS HARD TO SIT STILL: MORE THAN HALF THE DAYS
6. BECOMING EASILY ANNOYED OR IRRITABLE: MORE THAN HALF THE DAYS
1. FEELING NERVOUS, ANXIOUS, OR ON EDGE: NEARLY EVERY DAY
7. FEELING AFRAID AS IF SOMETHING AWFUL MIGHT HAPPEN: NEARLY EVERY DAY
IF YOU CHECKED OFF ANY PROBLEMS ON THIS QUESTIONNAIRE, HOW DIFFICULT HAVE THESE PROBLEMS MADE IT FOR YOU TO DO YOUR WORK, TAKE CARE OF THINGS AT HOME, OR GET ALONG WITH OTHER PEOPLE: VERY DIFFICULT
3. WORRYING TOO MUCH ABOUT DIFFERENT THINGS: NEARLY EVERY DAY
4. TROUBLE RELAXING: MORE THAN HALF THE DAYS
2. NOT BEING ABLE TO STOP OR CONTROL WORRYING: NEARLY EVERY DAY

## 2022-12-05 ASSESSMENT — FIBROSIS 4 INDEX: FIB4 SCORE: 1.324509441082082636

## 2022-12-05 NOTE — PROGRESS NOTES
"CC:   Chief Complaint   Patient presents with    Anxiety     And panic attacks         HISTORY OF PRESENT ILLNESS: Patient is a 52 y.o. female established patient who presents today to discuss the following problems below:     Anxiety  Patient reports her mom has recently moved in to their house and is rapidly declining in regards to her physical health.  Unfortunately over the Thanksgiving weekend, her mom required 4 different ER visits.  The patient's mom is currently on hospice and living at her home.  She is managing most of her medical care.  Patient reports that at one point about 14 years ago, she was taking Zoloft for anxiety, and was able to transition off of medications and manage this with better coping skills. She feels that almost on a daily basis she is experiencing anxiety with random panic attacks.  This includes nausea and vomiting during these episodes      Past Medical History:   Diagnosis Date    Familial tremor 6/14/2017    Lyme disease     Status post device closure of ASD        Allergies:Patient has no known allergies.    Review of Systems: Otherwise negative except for as stated above.      Exam: /72 (BP Location: Left arm, Patient Position: Sitting, BP Cuff Size: Adult)   Pulse 96   Temp 36.6 °C (97.9 °F) (Temporal)   Resp 16   Ht 1.664 m (5' 5.5\")   Wt 49.3 kg (108 lb 9.6 oz)   SpO2 99%  Body mass index is 17.8 kg/m².    Gen: Alert and oriented x4. Well developed, well-nourished female in no apparent distress.  MSK: Normal gait, moves all extremities.  Neuro: Alert and oriented x 4, non-focal exam with motor and sensory grossly intact.  Ext: No clubbing, cyanosis, edema.  Psych: Normal behavior, affect and mood.        12/5/2022     8:55 AM    ADRIANA-7 ANXIETY SCALE FLOWSHEET   Feeling nervous, anxious, or on edge 3   Not being able to stop or control worrying 3   Worrying too much about different things 3   Trouble relaxing 2   Being so restless that it is hard to sit still 2 "   Becoming easily annoyed or irritable 2   Feeling afraid as if something awful might happen 3   ADRIANA-7 Total Score 18   How difficult have these problems made it for you to do your work, take care of things at home, or get along with other people? Very difficult       Interpretation of ADRIANA-7 Total Score   Score Severity   0-4 Minimal Anxiety  5-9 Mild Anxiety   10-14 Moderate Anxiety  15-21 Severy Anxiety       Assessment/Plan:  52 y.o. female with the following -    1. Anxiety  Chronic condition, uncontrolled.  Discussed plan of care with patient.  Panic attacks are currently happening primarily in the evenings, but she does have persistent anxiety with nausea and vomiting during the day.  Started on low-dose sertraline to better manage overall anxiety.  Hydroxyzine given for nighttime panic attack episodes as this worked well for her daughter.  Trial of buspirone given for daily management of anxiety.  Zofran given for symptomatic management of nausea and vomiting secondary to anxiety.  Discussed with patient that as her anxiety symptoms improve, I would anticipate that she would not have to use the Zofran, buspirone, or hydroxyzine consistently.  Patient is agreeable to the plan.  She is currently already set up on a waiting list to get into therapy, so she does not need a refill for that today.    Follow-up in 1 month.    Return to clinic sooner for symptoms including but not limited to: worsening depression, suicidal ideation, anxiety, agitation, panic attacks, insomnia, irritability, hostility, aggressiveness, impulsivity, hypomania and chris    If such symptoms are observed, you or family need to contact us immediately, and consider calling the National Suicide Prevention Lifeline (1130.525.8542) or 666, or transporting patient to the emergency department for immediate evaluation.     SSRI Black Box Warnings include: Anxiety, agitation, panic attacks, insomnia, irritability, hostility, aggressiveness,  impulsivity, hypomania and chris have been reported in adult and pediatric patients being treated with SSRI for major depressive disorder as well as for other indications.    - hydrOXYzine HCl (ATARAX) 10 MG Tab; Take 1 Tablet by mouth at bedtime as needed (sleep).  Dispense: 30 Tablet; Refill: 0  - busPIRone (BUSPAR) 10 MG Tab tablet; Take 1 Tablet by mouth 2 times a day as needed (anxiety) for up to 30 days.  Dispense: 60 Tablet; Refill: 2  - sertraline (ZOLOFT) 25 MG tablet; Take 1 Tablet by mouth every day.  Dispense: 30 Tablet; Refill: 11  - ondansetron (ZOFRAN) 4 MG Tab tablet; Take 1 Tablet by mouth every four hours as needed for Nausea/Vomiting for up to 20 days.  Dispense: 20 Tablet; Refill: 0      Follow-up: Return in about 4 weeks (around 1/2/2023) for depression/anxiety follow up.    Health Maintenance: Completed      Please note that this dictation was created using voice recognition software. I have made every reasonable attempt to correct obvious errors, but I expect that there are errors of grammar and possibly content that I did not discover before finalizing the note.    Electronically signed by CARLOS Ortega on December 5, 2022

## 2022-12-05 NOTE — ASSESSMENT & PLAN NOTE
Patient reports her mom has recently moved in to their house and is rapidly declining in regards to her physical health.  Unfortunately over the Thanksgiving weekend, her mom required 4 different ER visits.  The patient's mom is currently on hospice and living at her home.  She is managing most of her medical care.  Patient reports that at one point about 14 years ago, she was taking Zoloft for anxiety, and was able to transition off of medications and manage this with better coping skills. She feels that almost on a daily basis she is experiencing anxiety with random panic attacks.  This includes nausea and vomiting during these episodes

## 2022-12-06 LAB
ALBUMIN 24H MFR UR ELPH: 70.8 %
ALPHA1 GLOB 24H MFR UR ELPH: 11.3 %
ALPHA2 GLOB 24H MFR UR ELPH: 3.8 %
B-GLOBULIN 24H MFR UR ELPH: 10.2 %
COLLECT DURATION TIME SPEC: NORMAL HRS
EER MONOCLONAL PROTEIN STUDY, 24 HOUR U Q5964: NORMAL
GAMMA GLOB 24H MFR UR ELPH: 3.9 %
INTERPRETATION UR IFE-IMP: NORMAL
M PROTEIN 24H MFR UR ELPH: 0 %
M PROTEIN 24H UR ELPH-MRATE: NORMAL MG/24 HRS
PROT 24H UR-MRATE: NORMAL MG/D (ref 40–150)
PROT UR-MCNC: 11 MG/DL
SPECIMEN VOL ?TM UR: NORMAL ML

## 2023-01-04 ENCOUNTER — OFFICE VISIT (OUTPATIENT)
Dept: MEDICAL GROUP | Facility: PHYSICIAN GROUP | Age: 53
End: 2023-01-04
Payer: COMMERCIAL

## 2023-01-04 VITALS
OXYGEN SATURATION: 97 % | BODY MASS INDEX: 18.09 KG/M2 | RESPIRATION RATE: 16 BRPM | WEIGHT: 108.6 LBS | TEMPERATURE: 97.5 F | HEIGHT: 65 IN | SYSTOLIC BLOOD PRESSURE: 100 MMHG | DIASTOLIC BLOOD PRESSURE: 72 MMHG | HEART RATE: 88 BPM

## 2023-01-04 DIAGNOSIS — F41.9 ANXIETY: ICD-10-CM

## 2023-01-04 PROCEDURE — 96127 BRIEF EMOTIONAL/BEHAV ASSMT: CPT

## 2023-01-04 PROCEDURE — 99213 OFFICE O/P EST LOW 20 MIN: CPT

## 2023-01-04 RX ORDER — ONDANSETRON 4 MG/1
4 TABLET, ORALLY DISINTEGRATING ORAL EVERY 6 HOURS PRN
Qty: 20 TABLET | Refills: 1 | Status: SHIPPED | OUTPATIENT
Start: 2023-01-04

## 2023-01-04 ASSESSMENT — PATIENT HEALTH QUESTIONNAIRE - PHQ9
SUM OF ALL RESPONSES TO PHQ QUESTIONS 1-9: 9
5. POOR APPETITE OR OVEREATING: 0 - NOT AT ALL
CLINICAL INTERPRETATION OF PHQ2 SCORE: 3

## 2023-01-04 ASSESSMENT — ANXIETY QUESTIONNAIRES
4. TROUBLE RELAXING: SEVERAL DAYS
7. FEELING AFRAID AS IF SOMETHING AWFUL MIGHT HAPPEN: MORE THAN HALF THE DAYS
6. BECOMING EASILY ANNOYED OR IRRITABLE: SEVERAL DAYS
5. BEING SO RESTLESS THAT IT IS HARD TO SIT STILL: SEVERAL DAYS
GAD7 TOTAL SCORE: 13
2. NOT BEING ABLE TO STOP OR CONTROL WORRYING: NEARLY EVERY DAY
IF YOU CHECKED OFF ANY PROBLEMS ON THIS QUESTIONNAIRE, HOW DIFFICULT HAVE THESE PROBLEMS MADE IT FOR YOU TO DO YOUR WORK, TAKE CARE OF THINGS AT HOME, OR GET ALONG WITH OTHER PEOPLE: SOMEWHAT DIFFICULT
1. FEELING NERVOUS, ANXIOUS, OR ON EDGE: MORE THAN HALF THE DAYS
3. WORRYING TOO MUCH ABOUT DIFFERENT THINGS: NEARLY EVERY DAY

## 2023-01-04 ASSESSMENT — FIBROSIS 4 INDEX: FIB4 SCORE: 1.324509441082082636

## 2023-01-04 NOTE — ASSESSMENT & PLAN NOTE
Patient presents for follow-up.  She was recently started on sertraline 25 mg daily for management of depression and anxiety as well as buspirone 10 mg twice daily as needed for acute episodes. She reports she had a somewhat difficult adjustment in the first two weeks but is overall feeling better. She did use buspirone in the first few weeks a couple of times, this was helpful. She did not notice a huge benefit with the hydroxyzine but feels like it was helpful when needed.

## 2023-01-04 NOTE — PROGRESS NOTES
"CC:   Chief Complaint   Patient presents with    Medication Follow-up        HISTORY OF PRESENT ILLNESS: Patient is a 52 y.o. female established patient who presents today to discuss the following problems below:     Anxiety  Patient presents for follow-up.  She was recently started on sertraline 25 mg daily for management of depression and anxiety as well as buspirone 10 mg twice daily as needed for acute episodes. She reports she had a somewhat difficult adjustment in the first two weeks but is overall feeling better. She did use buspirone in the first few weeks a couple of times, this was helpful. She did not notice a huge benefit with the hydroxyzine but feels like it was helpful when needed.       Past Medical History:   Diagnosis Date    Familial tremor 6/14/2017    Lyme disease     Status post device closure of ASD        Allergies:Patient has no known allergies.    Review of Systems: Otherwise negative except for as stated above.      Exam: /72 (BP Location: Left arm, Patient Position: Sitting, BP Cuff Size: Small adult)   Pulse 88   Temp 36.4 °C (97.5 °F) (Temporal)   Resp 16   Ht 1.651 m (5' 5\")   Wt 49.3 kg (108 lb 9.6 oz)   SpO2 97%  Body mass index is 18.07 kg/m².    Gen: Alert and oriented x4. Well developed, well-nourished female in no apparent distress.  Skin: Warm, dry, good turgor, no rashes in visible areas or lacerations appreciated.   MSK: Normal gait, moves all extremities.  Neuro: Alert and oriented x 4, non-focal exam with motor and sensory grossly intact.  Ext: No clubbing, cyanosis, edema.  Psych: Normal behavior, affect and mood.        1/4/2023 12/5/2022    ADRIANA-7 ANXIETY SCALE FLOWSHEET   Feeling nervous, anxious, or on edge 2 3   Not being able to stop or control worrying 3 3   Worrying too much about different things 3 3   Trouble relaxing 1 2   Being so restless that it is hard to sit still 1 2   Becoming easily annoyed or irritable 1 2   Feeling afraid as if something " awful might happen 2 3   ADRIANA-7 Total Score 13 18   How difficult have these problems made it for you to do your work, take care of things at home, or get along with other people? Somewhat difficult Very difficult       Multiple values from one day are sorted in reverse-chronological order       Interpretation of ADRIANA-7 Total Score   Score Severity   0-4 Minimal Anxiety  5-9 Mild Anxiety   10-14 Moderate Anxiety  15-21 Severy Anxiety        Assessment/Plan:  52 y.o. female with the following -    1. Anxiety  Chronic, not at goal but improved.  Stable.  Patient's ADRIANA-7 score was 18 last visit, has improved to 13 this visit.  Discussed with patient that I do feel there is room to increase her dose of Zoloft to 50 mg daily.  Patient is agreeable.  I will see her back in 4 weeks for recheck.  Additionally, patient does report that when she gets anxious episodes she does tend to get nauseous or vomiting.  She would like a refill of her Zofran to have on hand as needed for these episodes  - sertraline (ZOLOFT) 50 MG Tab; Take 1 Tablet by mouth every day.  Dispense: 30 Tablet; Refill: 11  - ondansetron (ZOFRAN ODT) 4 MG TABLET DISPERSIBLE; Take 1 Tablet by mouth every 6 hours as needed for Nausea/Vomiting.  Dispense: 20 Tablet; Refill: 1      Follow-up: Return in about 4 weeks (around 2/1/2023) for depression/anxiety follow up.    Health Maintenance: Completed      Please note that this dictation was created using voice recognition software. I have made every reasonable attempt to correct obvious errors, but I expect that there are errors of grammar and possibly content that I did not discover before finalizing the note.    Electronically signed by CARLOS Ortega on January 4, 2023

## 2023-02-02 ENCOUNTER — OFFICE VISIT (OUTPATIENT)
Dept: MEDICAL GROUP | Facility: PHYSICIAN GROUP | Age: 53
End: 2023-02-02
Payer: COMMERCIAL

## 2023-02-02 VITALS
RESPIRATION RATE: 16 BRPM | DIASTOLIC BLOOD PRESSURE: 66 MMHG | BODY MASS INDEX: 18.09 KG/M2 | WEIGHT: 108.6 LBS | HEART RATE: 100 BPM | SYSTOLIC BLOOD PRESSURE: 102 MMHG | HEIGHT: 65 IN | OXYGEN SATURATION: 99 % | TEMPERATURE: 97.4 F

## 2023-02-02 DIAGNOSIS — F41.9 ANXIETY: ICD-10-CM

## 2023-02-02 PROCEDURE — 96127 BRIEF EMOTIONAL/BEHAV ASSMT: CPT

## 2023-02-02 PROCEDURE — 99213 OFFICE O/P EST LOW 20 MIN: CPT

## 2023-02-02 ASSESSMENT — ANXIETY QUESTIONNAIRES
2. NOT BEING ABLE TO STOP OR CONTROL WORRYING: SEVERAL DAYS
3. WORRYING TOO MUCH ABOUT DIFFERENT THINGS: SEVERAL DAYS
6. BECOMING EASILY ANNOYED OR IRRITABLE: NOT AT ALL
GAD7 TOTAL SCORE: 3
5. BEING SO RESTLESS THAT IT IS HARD TO SIT STILL: SEVERAL DAYS
IF YOU CHECKED OFF ANY PROBLEMS ON THIS QUESTIONNAIRE, HOW DIFFICULT HAVE THESE PROBLEMS MADE IT FOR YOU TO DO YOUR WORK, TAKE CARE OF THINGS AT HOME, OR GET ALONG WITH OTHER PEOPLE: SOMEWHAT DIFFICULT
1. FEELING NERVOUS, ANXIOUS, OR ON EDGE: NOT AT ALL
4. TROUBLE RELAXING: NOT AT ALL
7. FEELING AFRAID AS IF SOMETHING AWFUL MIGHT HAPPEN: NOT AT ALL

## 2023-02-02 ASSESSMENT — FIBROSIS 4 INDEX: FIB4 SCORE: 1.35

## 2023-02-02 NOTE — ASSESSMENT & PLAN NOTE
Patient presents for follow up on her anxiety. Last visit, her sertraline was increased to 50 mg daily.  Her ADRIANA-7 initially was 18, improved to 13 on 25 mg of sertraline and she presents for reevaluation today.

## 2023-02-02 NOTE — PROGRESS NOTES
"CC:   Chief Complaint   Patient presents with    Anxiety     Follow up         HISTORY OF PRESENT ILLNESS: Patient is a 53 y.o. female established patient who presents today to discuss the following problems below:     Anxiety  Patient presents for follow up on her anxiety. Last visit, her sertraline was increased to 50 mg daily.  Her ADRIANA-7 initially was 18, improved to 13 on 25 mg of sertraline and she presents for reevaluation today.      Review of Systems: Otherwise negative except for as stated above.      Exam: /66 (BP Location: Left arm, Patient Position: Sitting, BP Cuff Size: Adult)   Pulse 100   Temp 36.3 °C (97.4 °F) (Temporal)   Resp 16   Ht 1.651 m (5' 5\")   Wt 49.3 kg (108 lb 9.6 oz)   SpO2 99%  Body mass index is 18.07 kg/m².    Physical Exam  Constitutional:       Appearance: Normal appearance.   Eyes:      Extraocular Movements: Extraocular movements intact.   Pulmonary:      Effort: Pulmonary effort is normal.   Musculoskeletal:      Cervical back: Normal range of motion and neck supple.   Lymphadenopathy:      Cervical: No cervical adenopathy.   Skin:     General: Skin is dry.   Neurological:      General: No focal deficit present.      Mental Status: She is alert and oriented to person, place, and time.   Psychiatric:         Mood and Affect: Mood normal.         Behavior: Behavior normal.         2/2/2023 1/4/2023 12/5/2022    ADRIANA-7 ANXIETY SCALE FLOWSHEET   Feeling nervous, anxious, or on edge 0 2 3   Not being able to stop or control worrying 1 3 3   Worrying too much about different things 1 3 3   Trouble relaxing 0 1 2   Being so restless that it is hard to sit still 1 1 2   Becoming easily annoyed or irritable 0 1 2   Feeling afraid as if something awful might happen 0 2 3   ADRIANA-7 Total Score 3 13 18   How difficult have these problems made it for you to do your work, take care of things at home, or get along with other people? Somewhat difficult Somewhat difficult Very " difficult       Multiple values from one day are sorted in reverse-chronological order       Interpretation of ADRIANA-7 Total Score   Score Severity   0-4 Minimal Anxiety  5-9 Mild Anxiety   10-14 Moderate Anxiety  15-21 Severy Anxiety        Assessment/Plan:  53 y.o. female with the following -    1. Anxiety  Chronic, controlled.  ADRIANA-7 is significantly improved from 18 at initial visit to 13 on 25 mg dosing to 3 today.  Patient reports that she feels that she has a normal amount of worry no that she is able to control and manage.  She is feeling significantly improved and is stable on her regimen.  Discussed with patient that there is quite a bit of room for dose increase should she feel that her symptoms are getting out of control again.  Patient verbalized understanding  - sertraline (ZOLOFT) 50 MG Tab; Take 1 Tablet by mouth every day.  Dispense: 90 Tablet; Refill: 3      Follow-up: Return in about 6 months (around 8/2/2023) for AWV.    Health Maintenance: Completed      Please note that this dictation was created using voice recognition software. I have made every reasonable attempt to correct obvious errors, but I expect that there are errors of grammar and possibly content that I did not discover before finalizing the note.    Electronically signed by CARLOS Ortega on February 2, 2023

## 2023-05-03 ENCOUNTER — OFFICE VISIT (OUTPATIENT)
Dept: MEDICAL GROUP | Facility: PHYSICIAN GROUP | Age: 53
End: 2023-05-03
Payer: COMMERCIAL

## 2023-05-03 VITALS
HEIGHT: 65 IN | DIASTOLIC BLOOD PRESSURE: 60 MMHG | TEMPERATURE: 97 F | SYSTOLIC BLOOD PRESSURE: 100 MMHG | OXYGEN SATURATION: 98 % | HEART RATE: 56 BPM | BODY MASS INDEX: 18.33 KG/M2 | WEIGHT: 110 LBS

## 2023-05-03 DIAGNOSIS — H61.23 IMPACTED CERUMEN OF BOTH EARS: ICD-10-CM

## 2023-05-03 DIAGNOSIS — Z12.31 ENCOUNTER FOR SCREENING MAMMOGRAM FOR MALIGNANT NEOPLASM OF BREAST: ICD-10-CM

## 2023-05-03 DIAGNOSIS — Z00.00 WELLNESS EXAMINATION: ICD-10-CM

## 2023-05-03 DIAGNOSIS — Z02.89 ENCOUNTER FOR COMPLETION OF FORM WITH PATIENT: ICD-10-CM

## 2023-05-03 PROCEDURE — 99396 PREV VISIT EST AGE 40-64: CPT

## 2023-05-03 PROCEDURE — 3078F DIAST BP <80 MM HG: CPT

## 2023-05-03 PROCEDURE — 3074F SYST BP LT 130 MM HG: CPT

## 2023-05-03 ASSESSMENT — FIBROSIS 4 INDEX: FIB4 SCORE: 1.35

## 2023-05-03 NOTE — PROGRESS NOTES
Subjective:     CC:   Chief Complaint   Patient presents with    Annual Wellness Visit     Needs medical clearance to supervise son's boy  trip       HPI:   Kaia Larson is a 53 y.o. female who presents for annual exam. She is feeling well and denies any complaints.    Ob-Gyn/ History:    Patient has GYN provider: no  /Para:    Last Pap Smear:  2018. History of abnormal pap smears x 1, then cleared   Gyn Surgery:  None.  Current Contraceptive Method:  NA. Currently currently sexually active.  Last menstrual period:  Menopausal.  No significant bloating/fluid retention, pelvic pain, or dyspareunia. No vaginal discharge  Post-menopausal bleeding: None  Urinary incontinence: None      Health Maintenance  Advanced directive: NA   Osteoporosis Screen/ DEXA: NA   PT/vit D for falls prevention: Counseled   Cholesterol Screening:   Lab Results   Component Value Date/Time    CHOLSTRLTOT 249 (H) 10/06/2022 09:13 AM    TRIGLYCERIDE 91 10/06/2022 09:13 AM    HDL 61 10/06/2022 09:13 AM     (H) 10/06/2022 09:13 AM   ]  Diabetes Screening:   Lab Results   Component Value Date/Time    SODIUM 139 10/06/2022 09:13 AM    POTASSIUM 4.3 10/06/2022 09:13 AM    CHLORIDE 102 10/06/2022 09:13 AM    CO2 28 10/06/2022 09:13 AM    ANION 9.0 10/06/2022 09:13 AM    GLUCOSE 88 10/06/2022 09:13 AM    BUN 22 10/06/2022 09:13 AM    CREATININE 0.55 10/06/2022 09:13 AM    CALCIUM 9.8 10/06/2022 09:13 AM    ASTSGOT 21 10/06/2022 09:13 AM    ALTSGPT 12 10/06/2022 09:13 AM    TBILIRUBIN 0.7 10/06/2022 09:13 AM    ALBUMIN 4.51 2022 12:22 PM    TOTPROTEIN 7.7 2022 12:22 PM    GLOBULIN 3.8 (H) 10/06/2022 09:13 AM    AGRATIO 1.1 10/06/2022 09:13 AM   ]   Aspirin Use: The 10-year ASCVD risk score (Ingrid BURK, et al., 2019) is: 1.2%        Anticipatory Guidance  Diet: Discussed, eats healthy diet, has gained 5 lbs since last visit - lots of salads, keeps good balance  Exercise: Walking, working in the garden,  skiing   Substance Abuse: None   Safe in relationship. Yes  Seat belts, bike helmet, gun safety discussed.  Sun protection used.    Cancer screening  Colorectal Cancer Screening: Due 11/2025    Lung Cancer Screening: NA    Cervical Cancer Screening: Due 11/2023, last done in 11/2018 with HPV cotesting negative  Breast Cancer Screening: Ordered     Infectious disease screening/Immunizations  --STI Screening: Declines   --Practices safe sex.  --HIV Screening: Ordered   --Hepatitis C Screening: Ordered   --Immunizations:    Influenza: NA    HPV:  NA    Tetanus: Due 2032    Shingles: Counsled   Pneumococcal :       NA      Other immunizations: Hep B counseled     She  has a past medical history of Familial tremor (6/14/2017), Lyme disease, and Status post device closure of ASD.  She  has a past surgical history that includes other cardiac surgery.    Family History   Problem Relation Age of Onset    Heart Attack Mother     Cancer Mother 50        breast    Diabetes Mother     Depression Mother        Social History     Socioeconomic History    Marital status:      Spouse name: Not on file    Number of children: Not on file    Years of education: Not on file    Highest education level: Not on file   Occupational History    Not on file   Tobacco Use    Smoking status: Former    Smokeless tobacco: Never    Tobacco comments:     social smoker in the 90s   Vaping Use    Vaping Use: Never used   Substance and Sexual Activity    Alcohol use: Yes     Alcohol/week: 2.4 oz     Types: 4 Glasses of wine per week    Drug use: No    Sexual activity: Yes     Partners: Male   Other Topics Concern    Not on file   Social History Narrative    Not on file     Social Determinants of Health     Financial Resource Strain: Not on file   Food Insecurity: Not on file   Transportation Needs: Not on file   Physical Activity: Not on file   Stress: Not on file   Social Connections: Not on file   Intimate Partner Violence: Not on file  "  Housing Stability: Not on file       Patient Active Problem List    Diagnosis Date Noted    Pure hypercholesterolemia 10/27/2022    Neutropenia (HCC) 10/27/2022    Dairy product intolerance 09/26/2022    Recurrent major depressive disorder, in partial remission (HCC) 09/26/2022    Vaginal itching 11/07/2018    Lyme disease 06/14/2017    Familial tremor 06/14/2017    Anxiety 06/14/2017         Current Outpatient Medications   Medication Sig Dispense Refill    sertraline (ZOLOFT) 50 MG Tab Take 1 Tablet by mouth every day. 90 Tablet 3    ondansetron (ZOFRAN ODT) 4 MG TABLET DISPERSIBLE Take 1 Tablet by mouth every 6 hours as needed for Nausea/Vomiting. 20 Tablet 1    hydrOXYzine HCl (ATARAX) 10 MG Tab Take 1 Tablet by mouth at bedtime as needed (sleep). 30 Tablet 0    estrogens, conjugated (PREMARIN) 0.625 MG/GM Cream Insert 0.5 g into the vagina every day. 30 g 2     No current facility-administered medications for this visit.     No Known Allergies    Review of Systems   Constitutional: Negative for fever, chills and malaise/fatigue.   HENT: Negative for congestion.    Eyes: Negative for pain.   Respiratory: Negative for cough and shortness of breath.    Cardiovascular: Negative for leg swelling.   Gastrointestinal: Negative for nausea, vomiting, abdominal pain and diarrhea.   Genitourinary: Negative for dysuria and hematuria.   Skin: Negative for rash.   Neurological: Negative for dizziness, focal weakness and headaches.   Endo/Heme/Allergies: Does not bruise/bleed easily.   Psychiatric/Behavioral: Negative for depression.  The patient is not nervous/anxious.      Objective:     /60   Pulse (!) 56   Temp 36.1 °C (97 °F) (Temporal)   Ht 1.651 m (5' 5\")   Wt 49.9 kg (110 lb)   LMP 06/01/2017   SpO2 98%   BMI 18.30 kg/m²   Body mass index is 18.3 kg/m².  Wt Readings from Last 4 Encounters:   05/03/23 49.9 kg (110 lb)   03/13/23 47.6 kg (105 lb)   02/02/23 49.3 kg (108 lb 9.6 oz)   01/04/23 49.3 kg (108 " lb 9.6 oz)       Physical Exam:  Constitutional: Well-developed and well-nourished. Not diaphoretic. No distress.   Skin: Skin is warm and dry. No rash noted.  Head: Atraumatic without lesions.  Eyes: Conjunctivae and extraocular motions are normal. Pupils are equal, round, and reactive to light. No scleral icterus.   Ears:  External ears unremarkable. Tympanic membranes clear and intact.  Ears: Impacted cerumen bilaterally  Nose: Nares patent. Septum midline. Turbinates without erythema nor edema. No discharge.   Mouth/Throat: Dentition is intact. Tongue normal. Oropharynx is clear and moist. Posterior pharynx without erythema or exudates.  Neck: Supple, trachea midline. Normal range of motion. No thyromegaly present. No lymphadenopathy--cervical or supraclavicular.  Cardiovascular: Regular rate and rhythm, S1 and S2 without murmur, rubs, or gallops.  Lungs: Normal inspiratory effort, CTA bilaterally, no wheezes/rhonchi/rales  Breast: Declines  Abdomen: Soft, non tender, and without distention. Active bowel sounds in all four quadrants. No rebound, guarding, masses or HSM.  :Declines, will get pap done in 11/2023  Extremities: No cyanosis, clubbing, erythema, nor edema. Distal pulses intact and symmetric.   Musculoskeletal: All major joints AROM full in all directions without pain.  Neurological: Alert and oriented x 3. DTRs 2+/3 and symmetric. No cranial nerve deficit. 5/5 myotomes. Sensation intact.   Psychiatric:  Behavior, mood, and affect are appropriate.    A chaperone was offered to the patient during today's exam. Patient declined chaperone.    Assessment and Plan:     1. Wellness examination        2. Encounter for screening mammogram for malignant neoplasm of breast  [facility]-SCREENING MAMMO BILAT W/TOMOSYNTHESIS W/CAD      3. Impacted cerumen of both ears  Ear Irrigation ([facility] Only)      4. Encounter for completion of form with patient            HCM:  Up to date   Labs per orders  Immunizations per  orders  Patient counseled about skin care, diet, supplements, prenatal vitamins, safe sex and exercise.      Follow-up: Return in about 6 months (around 11/3/2023) for pap.

## 2023-05-30 ENCOUNTER — HOSPITAL ENCOUNTER (OUTPATIENT)
Dept: RADIOLOGY | Facility: MEDICAL CENTER | Age: 53
End: 2023-05-30
Payer: COMMERCIAL

## 2023-05-30 DIAGNOSIS — Z12.31 ENCOUNTER FOR SCREENING MAMMOGRAM FOR MALIGNANT NEOPLASM OF BREAST: ICD-10-CM

## 2023-05-30 PROCEDURE — 77063 BREAST TOMOSYNTHESIS BI: CPT

## 2023-10-11 SDOH — ECONOMIC STABILITY: TRANSPORTATION INSECURITY
IN THE PAST 12 MONTHS, HAS THE LACK OF TRANSPORTATION KEPT YOU FROM MEDICAL APPOINTMENTS OR FROM GETTING MEDICATIONS?: NO

## 2023-10-11 SDOH — HEALTH STABILITY: PHYSICAL HEALTH: ON AVERAGE, HOW MANY MINUTES DO YOU ENGAGE IN EXERCISE AT THIS LEVEL?: 30 MIN

## 2023-10-11 SDOH — ECONOMIC STABILITY: TRANSPORTATION INSECURITY
IN THE PAST 12 MONTHS, HAS LACK OF TRANSPORTATION KEPT YOU FROM MEETINGS, WORK, OR FROM GETTING THINGS NEEDED FOR DAILY LIVING?: NO

## 2023-10-11 SDOH — ECONOMIC STABILITY: FOOD INSECURITY: WITHIN THE PAST 12 MONTHS, YOU WORRIED THAT YOUR FOOD WOULD RUN OUT BEFORE YOU GOT MONEY TO BUY MORE.: NEVER TRUE

## 2023-10-11 SDOH — ECONOMIC STABILITY: FOOD INSECURITY: WITHIN THE PAST 12 MONTHS, THE FOOD YOU BOUGHT JUST DIDN'T LAST AND YOU DIDN'T HAVE MONEY TO GET MORE.: NEVER TRUE

## 2023-10-11 SDOH — HEALTH STABILITY: MENTAL HEALTH
STRESS IS WHEN SOMEONE FEELS TENSE, NERVOUS, ANXIOUS, OR CAN'T SLEEP AT NIGHT BECAUSE THEIR MIND IS TROUBLED. HOW STRESSED ARE YOU?: ONLY A LITTLE

## 2023-10-11 SDOH — ECONOMIC STABILITY: INCOME INSECURITY: IN THE LAST 12 MONTHS, WAS THERE A TIME WHEN YOU WERE NOT ABLE TO PAY THE MORTGAGE OR RENT ON TIME?: NO

## 2023-10-11 SDOH — ECONOMIC STABILITY: HOUSING INSECURITY: IN THE LAST 12 MONTHS, HOW MANY PLACES HAVE YOU LIVED?: 1

## 2023-10-11 SDOH — HEALTH STABILITY: PHYSICAL HEALTH: ON AVERAGE, HOW MANY DAYS PER WEEK DO YOU ENGAGE IN MODERATE TO STRENUOUS EXERCISE (LIKE A BRISK WALK)?: 2 DAYS

## 2023-10-11 SDOH — ECONOMIC STABILITY: HOUSING INSECURITY
IN THE LAST 12 MONTHS, WAS THERE A TIME WHEN YOU DID NOT HAVE A STEADY PLACE TO SLEEP OR SLEPT IN A SHELTER (INCLUDING NOW)?: NO

## 2023-10-11 SDOH — ECONOMIC STABILITY: TRANSPORTATION INSECURITY
IN THE PAST 12 MONTHS, HAS LACK OF RELIABLE TRANSPORTATION KEPT YOU FROM MEDICAL APPOINTMENTS, MEETINGS, WORK OR FROM GETTING THINGS NEEDED FOR DAILY LIVING?: NO

## 2023-10-11 SDOH — ECONOMIC STABILITY: INCOME INSECURITY: HOW HARD IS IT FOR YOU TO PAY FOR THE VERY BASICS LIKE FOOD, HOUSING, MEDICAL CARE, AND HEATING?: NOT HARD AT ALL

## 2023-10-11 ASSESSMENT — SOCIAL DETERMINANTS OF HEALTH (SDOH)
DO YOU BELONG TO ANY CLUBS OR ORGANIZATIONS SUCH AS CHURCH GROUPS UNIONS, FRATERNAL OR ATHLETIC GROUPS, OR SCHOOL GROUPS?: YES
HOW OFTEN DO YOU HAVE A DRINK CONTAINING ALCOHOL: 4 OR MORE TIMES A WEEK
IN A TYPICAL WEEK, HOW MANY TIMES DO YOU TALK ON THE PHONE WITH FAMILY, FRIENDS, OR NEIGHBORS?: NEVER
HOW OFTEN DO YOU ATTENT MEETINGS OF THE CLUB OR ORGANIZATION YOU BELONG TO?: MORE THAN 4 TIMES PER YEAR
HOW OFTEN DO YOU GET TOGETHER WITH FRIENDS OR RELATIVES?: ONCE A WEEK
HOW OFTEN DO YOU GET TOGETHER WITH FRIENDS OR RELATIVES?: ONCE A WEEK
HOW HARD IS IT FOR YOU TO PAY FOR THE VERY BASICS LIKE FOOD, HOUSING, MEDICAL CARE, AND HEATING?: NOT HARD AT ALL
HOW OFTEN DO YOU ATTEND CHURCH OR RELIGIOUS SERVICES?: 1 TO 4 TIMES PER YEAR
WITHIN THE PAST 12 MONTHS, YOU WORRIED THAT YOUR FOOD WOULD RUN OUT BEFORE YOU GOT THE MONEY TO BUY MORE: NEVER TRUE
DO YOU BELONG TO ANY CLUBS OR ORGANIZATIONS SUCH AS CHURCH GROUPS UNIONS, FRATERNAL OR ATHLETIC GROUPS, OR SCHOOL GROUPS?: YES
HOW OFTEN DO YOU HAVE SIX OR MORE DRINKS ON ONE OCCASION: NEVER
HOW MANY DRINKS CONTAINING ALCOHOL DO YOU HAVE ON A TYPICAL DAY WHEN YOU ARE DRINKING: 1 OR 2
IN A TYPICAL WEEK, HOW MANY TIMES DO YOU TALK ON THE PHONE WITH FAMILY, FRIENDS, OR NEIGHBORS?: NEVER
HOW OFTEN DO YOU ATTENT MEETINGS OF THE CLUB OR ORGANIZATION YOU BELONG TO?: MORE THAN 4 TIMES PER YEAR
HOW OFTEN DO YOU ATTEND CHURCH OR RELIGIOUS SERVICES?: 1 TO 4 TIMES PER YEAR

## 2023-10-11 ASSESSMENT — LIFESTYLE VARIABLES
HOW OFTEN DO YOU HAVE SIX OR MORE DRINKS ON ONE OCCASION: NEVER
HOW MANY STANDARD DRINKS CONTAINING ALCOHOL DO YOU HAVE ON A TYPICAL DAY: 1 OR 2
SKIP TO QUESTIONS 9-10: 1
HOW OFTEN DO YOU HAVE A DRINK CONTAINING ALCOHOL: 4 OR MORE TIMES A WEEK
AUDIT-C TOTAL SCORE: 4

## 2023-10-12 ENCOUNTER — APPOINTMENT (OUTPATIENT)
Dept: MEDICAL GROUP | Facility: PHYSICIAN GROUP | Age: 53
End: 2023-10-12
Payer: COMMERCIAL

## 2023-11-09 ENCOUNTER — HOSPITAL ENCOUNTER (OUTPATIENT)
Facility: MEDICAL CENTER | Age: 53
End: 2023-11-09
Payer: COMMERCIAL

## 2023-11-09 ENCOUNTER — OFFICE VISIT (OUTPATIENT)
Dept: MEDICAL GROUP | Facility: PHYSICIAN GROUP | Age: 53
End: 2023-11-09
Payer: COMMERCIAL

## 2023-11-09 VITALS
HEART RATE: 67 BPM | SYSTOLIC BLOOD PRESSURE: 98 MMHG | HEIGHT: 65 IN | TEMPERATURE: 97.7 F | DIASTOLIC BLOOD PRESSURE: 58 MMHG | WEIGHT: 115 LBS | OXYGEN SATURATION: 99 % | RESPIRATION RATE: 12 BRPM | BODY MASS INDEX: 19.16 KG/M2

## 2023-11-09 DIAGNOSIS — F33.41 RECURRENT MAJOR DEPRESSIVE DISORDER, IN PARTIAL REMISSION (HCC): ICD-10-CM

## 2023-11-09 DIAGNOSIS — Z11.59 NEED FOR HEPATITIS C SCREENING TEST: ICD-10-CM

## 2023-11-09 DIAGNOSIS — Z11.4 SCREENING FOR HIV (HUMAN IMMUNODEFICIENCY VIRUS): ICD-10-CM

## 2023-11-09 DIAGNOSIS — Z00.00 WELLNESS EXAMINATION: ICD-10-CM

## 2023-11-09 DIAGNOSIS — Z23 NEED FOR VACCINATION: ICD-10-CM

## 2023-11-09 PROCEDURE — 3074F SYST BP LT 130 MM HG: CPT

## 2023-11-09 PROCEDURE — 90686 IIV4 VACC NO PRSV 0.5 ML IM: CPT

## 2023-11-09 PROCEDURE — 88175 CYTOPATH C/V AUTO FLUID REDO: CPT

## 2023-11-09 PROCEDURE — 90471 IMMUNIZATION ADMIN: CPT

## 2023-11-09 PROCEDURE — 3078F DIAST BP <80 MM HG: CPT

## 2023-11-09 PROCEDURE — 87624 HPV HI-RISK TYP POOLED RSLT: CPT

## 2023-11-09 PROCEDURE — 99396 PREV VISIT EST AGE 40-64: CPT | Mod: 25

## 2023-11-09 ASSESSMENT — FIBROSIS 4 INDEX: FIB4 SCORE: 1.35

## 2023-11-14 LAB
CYTOLOGIST CVX/VAG CYTO: NORMAL
CYTOLOGY CVX/VAG DOC CYTO: NORMAL
CYTOLOGY CVX/VAG DOC THIN PREP: NORMAL
HPV I/H RISK 4 DNA CVX QL PROBE+SIG AMP: NEGATIVE
NOTE NL11727A: NORMAL
OTHER STN SPEC: NORMAL
STAT OF ADQ CVX/VAG CYTO-IMP: NORMAL

## 2024-02-19 DIAGNOSIS — F41.9 ANXIETY: ICD-10-CM

## 2024-02-21 NOTE — TELEPHONE ENCOUNTER
Pharmacy called in because the insurance does not USE NovoLOG and if it is okay with Dr. Ruchi Delaney they can use humaLOG.  Please resend to pharmacy, if there are questions you can reach them at [phone] Received request via: Patient    Was the patient seen in the last year in this department? Yes    Does the patient have an active prescription (recently filled or refills available) for medication(s) requested? No    [facility]    Does the patient have [facility] Plus and need 100 day supply (blood pressure, diabetes and cholesterol meds only)? Patient does not have SCP

## 2024-06-19 ENCOUNTER — OFFICE VISIT (OUTPATIENT)
Dept: MEDICAL GROUP | Facility: PHYSICIAN GROUP | Age: 54
End: 2024-06-19
Payer: COMMERCIAL

## 2024-06-19 VITALS
HEART RATE: 64 BPM | WEIGHT: 112 LBS | DIASTOLIC BLOOD PRESSURE: 64 MMHG | OXYGEN SATURATION: 97 % | TEMPERATURE: 97.2 F | BODY MASS INDEX: 18.66 KG/M2 | SYSTOLIC BLOOD PRESSURE: 104 MMHG | HEIGHT: 65 IN

## 2024-06-19 DIAGNOSIS — J02.9 SORE THROAT: ICD-10-CM

## 2024-06-19 DIAGNOSIS — J06.9 ACUTE URI: ICD-10-CM

## 2024-06-19 DIAGNOSIS — R52 BODY ACHES: ICD-10-CM

## 2024-06-19 LAB
FLUAV RNA SPEC QL NAA+PROBE: NEGATIVE
FLUBV RNA SPEC QL NAA+PROBE: NEGATIVE
RSV RNA SPEC QL NAA+PROBE: NEGATIVE
S PYO DNA SPEC NAA+PROBE: NOT DETECTED
SARS-COV-2 RNA RESP QL NAA+PROBE: NEGATIVE

## 2024-06-19 PROCEDURE — 0241U POCT CEPHEID COV-2, FLU A/B, RSV - PCR: CPT | Performed by: NURSE PRACTITIONER

## 2024-06-19 PROCEDURE — 99213 OFFICE O/P EST LOW 20 MIN: CPT | Performed by: NURSE PRACTITIONER

## 2024-06-19 PROCEDURE — 87651 STREP A DNA AMP PROBE: CPT | Performed by: NURSE PRACTITIONER

## 2024-06-19 PROCEDURE — 3078F DIAST BP <80 MM HG: CPT | Performed by: NURSE PRACTITIONER

## 2024-06-19 PROCEDURE — 3074F SYST BP LT 130 MM HG: CPT | Performed by: NURSE PRACTITIONER

## 2024-06-19 ASSESSMENT — FIBROSIS 4 INDEX: FIB4 SCORE: 1.375452111892931968

## 2024-06-19 NOTE — PROGRESS NOTES
"Subjective:     CC: Sore throat    HPI:   Kaia is an established patient of GIL Ortega who presents today with the following:    Sore throat   The sore throat, body aches, nasal congestion and productive cough started 1 week ago. The cough dries up during the day. She did lose her voice x 2 days. Aggravating factors include none. Alleviating factors include Ibuprofen. Interventions tried include Ibuprofen, humidifier, Nyquil. Sick contacts at home with strep throat. Denies fever, chills, ear pain, sinus pain, loss of taste, loss of smell, itchy eyes, watery eyes, chest pain or shortness of breath.      Past Medical History:   Diagnosis Date    Familial tremor 6/14/2017    Lyme disease     Status post device closure of ASD        Social History     Tobacco Use    Smoking status: Former    Smokeless tobacco: Never    Tobacco comments:     social smoker in the 90s   Vaping Use    Vaping status: Never Used   Substance Use Topics    Alcohol use: Yes     Alcohol/week: 2.4 oz     Types: 4 Glasses of wine per week     Comment: occ    Drug use: No       Current Outpatient Medications Ordered in Epic   Medication Sig Dispense Refill    sertraline (ZOLOFT) 50 MG Tab TAKE 1 TABLET BY MOUTH EVERY DAY 90 Tablet 3    ondansetron (ZOFRAN ODT) 4 MG TABLET DISPERSIBLE Take 1 Tablet by mouth every 6 hours as needed for Nausea/Vomiting. 20 Tablet 1    hydrOXYzine HCl (ATARAX) 10 MG Tab Take 1 Tablet by mouth at bedtime as needed (sleep). 30 Tablet 0    estrogens, conjugated (PREMARIN) 0.625 MG/GM Cream Insert 0.5 g into the vagina every day. 30 g 2     No current Epic-ordered facility-administered medications on file.       Allergies:  Patient has no known allergies.    Health Maintenance: deferred       Objective:     Vital signs reviewed  Exam:  /64 (BP Location: Left arm, Patient Position: Sitting, BP Cuff Size: Adult)   Pulse 64   Temp 36.2 °C (97.2 °F) (Temporal)   Ht 1.651 m (5' 5\")   Wt 50.8 kg (112 lb)   " LMP 06/01/2017   SpO2 97%   BMI 18.64 kg/m²  Body mass index is 18.64 kg/m².    General: Normal appearing. No distress.  HENT: Normocephalic. Ears normal shape and contour, canals are clear bilaterally, tympanic membranes are benign, nasal mucosa benign, oropharynx is with erythema, no edema or exudates. No pain to frontal or maxillary sinuses.   Eyes: Eyes conjunctiva clear lids without ptosis, pupils equal and reactive to light accommodation, lids normal. Wears glasses.   Pulmonary: Clear to ausculation.  Normal effort. No rales, ronchi, or wheezing.  Cardiovascular: Regular rate and rhythm without murmur.   Lymph: No supraclavicular lymph nodes are palpable. +mild cervical lymphadenopathy.   Skin: Warm and dry.  No obvious lesions.  Psych: Normal mood and affect. Alert and oriented x3. Judgment and insight is normal.        Labs:      Latest Reference Range & Units 06/19/24 15:13   POC Group A Strep, PCR Not Detected, Invalid  Not Detected        Latest Reference Range & Units 06/19/24 15:13   Influenza virus A RNA Negative, Invalid  Negative   Influenza virus B, PCR Negative, Invalid  Negative   RSV, PCR Negative, Invalid  Negative   SARS-CoV-2 by PCR Negative, Invalid  Negative     Results released to [facility].     Assessment & Plan:     54 y.o. female with the following -     1. Acute URI  Acute uncomplicated problem.  POCT testing negative today for strep, influenza, COVID RSV.   Recommend symptomatic care.  Continue with ibuprofen and stay hydrated.      2. Sore throat  Acute uncomplicated problem.  Test negative today.  See #1 above.  - POCT CEPHEID GROUP A STREP - PCR  - POCT CoV-2, Flu A/B, RSV by PCR    3. Body aches  Acute complicated problem.  Test negative today.  See #1 above.  - POCT CoV-2, Flu A/B, RSV by PCR        Return if symptoms worsen or fail to improve.    Please note that this dictation was created using voice recognition software. I have made every reasonable attempt to correct obvious  errors, but I expect that there are errors of grammar and possibly content that I did not discover before finalizing the note.

## 2025-03-13 ENCOUNTER — APPOINTMENT (OUTPATIENT)
Dept: MEDICAL GROUP | Facility: PHYSICIAN GROUP | Age: 55
End: 2025-03-13
Payer: COMMERCIAL

## 2025-03-17 DIAGNOSIS — F41.9 ANXIETY: ICD-10-CM

## 2025-03-17 NOTE — TELEPHONE ENCOUNTER
Received request via: Patient    Was the patient seen in the last year in this department? Yes    Does the patient have an active prescription (recently filled or refills available) for medication(s) requested? No    [email or web address]    Does the patient have assisted Plus and need 100-day supply? (This applies to ALL medications) Patient does not have SCP

## 2025-05-07 SDOH — ECONOMIC STABILITY: FOOD INSECURITY: WITHIN THE PAST 12 MONTHS, THE FOOD YOU BOUGHT JUST DIDN'T LAST AND YOU DIDN'T HAVE MONEY TO GET MORE.: NEVER TRUE

## 2025-05-07 SDOH — ECONOMIC STABILITY: FOOD INSECURITY: WITHIN THE PAST 12 MONTHS, YOU WORRIED THAT YOUR FOOD WOULD RUN OUT BEFORE YOU GOT MONEY TO BUY MORE.: NEVER TRUE

## 2025-05-07 SDOH — ECONOMIC STABILITY: INCOME INSECURITY: IN THE LAST 12 MONTHS, WAS THERE A TIME WHEN YOU WERE NOT ABLE TO PAY THE MORTGAGE OR RENT ON TIME?: NO

## 2025-05-07 SDOH — HEALTH STABILITY: PHYSICAL HEALTH: ON AVERAGE, HOW MANY MINUTES DO YOU ENGAGE IN EXERCISE AT THIS LEVEL?: 10 MIN

## 2025-05-07 SDOH — HEALTH STABILITY: PHYSICAL HEALTH: ON AVERAGE, HOW MANY DAYS PER WEEK DO YOU ENGAGE IN MODERATE TO STRENUOUS EXERCISE (LIKE A BRISK WALK)?: 1 DAY

## 2025-05-07 SDOH — ECONOMIC STABILITY: INCOME INSECURITY: HOW HARD IS IT FOR YOU TO PAY FOR THE VERY BASICS LIKE FOOD, HOUSING, MEDICAL CARE, AND HEATING?: NOT HARD AT ALL

## 2025-05-07 ASSESSMENT — SOCIAL DETERMINANTS OF HEALTH (SDOH)
IN THE PAST 12 MONTHS, HAS THE ELECTRIC, GAS, OIL, OR WATER COMPANY THREATENED TO SHUT OFF SERVICE IN YOUR HOME?: NO
HOW OFTEN DO YOU ATTEND CHURCH OR RELIGIOUS SERVICES?: MORE THAN 4 TIMES PER YEAR
HOW OFTEN DO YOU GET TOGETHER WITH FRIENDS OR RELATIVES?: ONCE A WEEK
HOW OFTEN DO YOU ATTENT MEETINGS OF THE CLUB OR ORGANIZATION YOU BELONG TO?: MORE THAN 4 TIMES PER YEAR
IN A TYPICAL WEEK, HOW MANY TIMES DO YOU TALK ON THE PHONE WITH FAMILY, FRIENDS, OR NEIGHBORS?: ONCE A WEEK
DO YOU BELONG TO ANY CLUBS OR ORGANIZATIONS SUCH AS CHURCH GROUPS UNIONS, FRATERNAL OR ATHLETIC GROUPS, OR SCHOOL GROUPS?: YES

## 2025-05-07 ASSESSMENT — LIFESTYLE VARIABLES
AUDIT-C TOTAL SCORE: 4
HOW OFTEN DO YOU HAVE SIX OR MORE DRINKS ON ONE OCCASION: NEVER
SKIP TO QUESTIONS 9-10: 1
HOW OFTEN DO YOU HAVE A DRINK CONTAINING ALCOHOL: 4 OR MORE TIMES A WEEK
HOW MANY STANDARD DRINKS CONTAINING ALCOHOL DO YOU HAVE ON A TYPICAL DAY: 1 OR 2

## 2025-05-08 ENCOUNTER — APPOINTMENT (OUTPATIENT)
Dept: MEDICAL GROUP | Facility: PHYSICIAN GROUP | Age: 55
End: 2025-05-08
Payer: COMMERCIAL

## 2025-05-08 SDOH — HEALTH STABILITY: MENTAL HEALTH
STRESS IS WHEN SOMEONE FEELS TENSE, NERVOUS, ANXIOUS, OR CAN'T SLEEP AT NIGHT BECAUSE THEIR MIND IS TROUBLED. HOW STRESSED ARE YOU?: TO SOME EXTENT

## 2025-05-08 SDOH — HEALTH STABILITY: PHYSICAL HEALTH: ON AVERAGE, HOW MANY MINUTES DO YOU ENGAGE IN EXERCISE AT THIS LEVEL?: 10 MIN

## 2025-05-08 SDOH — HEALTH STABILITY: PHYSICAL HEALTH: ON AVERAGE, HOW MANY DAYS PER WEEK DO YOU ENGAGE IN MODERATE TO STRENUOUS EXERCISE (LIKE A BRISK WALK)?: 1 DAY

## 2025-05-08 ASSESSMENT — SOCIAL DETERMINANTS OF HEALTH (SDOH)
HOW OFTEN DO YOU ATTEND CHURCH OR RELIGIOUS SERVICES?: MORE THAN 4 TIMES PER YEAR
HOW MANY DRINKS CONTAINING ALCOHOL DO YOU HAVE ON A TYPICAL DAY WHEN YOU ARE DRINKING: 1 OR 2
HOW OFTEN DO YOU ATTENT MEETINGS OF THE CLUB OR ORGANIZATION YOU BELONG TO?: MORE THAN 4 TIMES PER YEAR
WITHIN THE PAST 12 MONTHS, YOU WORRIED THAT YOUR FOOD WOULD RUN OUT BEFORE YOU GOT THE MONEY TO BUY MORE: NEVER TRUE
HOW OFTEN DO YOU HAVE SIX OR MORE DRINKS ON ONE OCCASION: NEVER
HOW OFTEN DO YOU HAVE A DRINK CONTAINING ALCOHOL: 4 OR MORE TIMES A WEEK
DO YOU BELONG TO ANY CLUBS OR ORGANIZATIONS SUCH AS CHURCH GROUPS UNIONS, FRATERNAL OR ATHLETIC GROUPS, OR SCHOOL GROUPS?: YES
IN A TYPICAL WEEK, HOW MANY TIMES DO YOU TALK ON THE PHONE WITH FAMILY, FRIENDS, OR NEIGHBORS?: ONCE A WEEK
HOW OFTEN DO YOU GET TOGETHER WITH FRIENDS OR RELATIVES?: ONCE A WEEK
HOW HARD IS IT FOR YOU TO PAY FOR THE VERY BASICS LIKE FOOD, HOUSING, MEDICAL CARE, AND HEATING?: NOT HARD AT ALL

## 2025-06-17 ENCOUNTER — HOSPITAL ENCOUNTER (OUTPATIENT)
Dept: LAB | Facility: MEDICAL CENTER | Age: 55
End: 2025-06-17
Payer: COMMERCIAL

## 2025-06-17 ENCOUNTER — APPOINTMENT (OUTPATIENT)
Dept: MEDICAL GROUP | Facility: PHYSICIAN GROUP | Age: 55
End: 2025-06-17
Payer: COMMERCIAL

## 2025-06-17 VITALS
OXYGEN SATURATION: 99 % | HEIGHT: 65 IN | SYSTOLIC BLOOD PRESSURE: 96 MMHG | BODY MASS INDEX: 19.63 KG/M2 | WEIGHT: 117.8 LBS | DIASTOLIC BLOOD PRESSURE: 62 MMHG | TEMPERATURE: 97.1 F | RESPIRATION RATE: 13 BRPM | HEART RATE: 67 BPM

## 2025-06-17 DIAGNOSIS — F41.9 ANXIETY: ICD-10-CM

## 2025-06-17 DIAGNOSIS — Z11.4 SCREENING FOR HIV (HUMAN IMMUNODEFICIENCY VIRUS): ICD-10-CM

## 2025-06-17 DIAGNOSIS — R51.9 GENERALIZED HEADACHES: ICD-10-CM

## 2025-06-17 DIAGNOSIS — Z12.11 COLON CANCER SCREENING: ICD-10-CM

## 2025-06-17 DIAGNOSIS — Z11.59 NEED FOR HEPATITIS C SCREENING TEST: ICD-10-CM

## 2025-06-17 DIAGNOSIS — Z00.00 WELLNESS EXAMINATION: ICD-10-CM

## 2025-06-17 DIAGNOSIS — Z12.31 ENCOUNTER FOR SCREENING MAMMOGRAM FOR MALIGNANT NEOPLASM OF BREAST: ICD-10-CM

## 2025-06-17 DIAGNOSIS — Z00.00 WELLNESS EXAMINATION: Primary | ICD-10-CM

## 2025-06-17 LAB
ALBUMIN SERPL BCP-MCNC: 4.6 G/DL (ref 3.2–4.9)
ALBUMIN/GLOB SERPL: 1.5 G/DL
ALP SERPL-CCNC: 63 U/L (ref 30–99)
ALT SERPL-CCNC: 16 U/L (ref 2–50)
ANION GAP SERPL CALC-SCNC: 11 MMOL/L (ref 7–16)
AST SERPL-CCNC: 22 U/L (ref 12–45)
BILIRUB SERPL-MCNC: 0.8 MG/DL (ref 0.1–1.5)
BUN SERPL-MCNC: 21 MG/DL (ref 8–22)
CALCIUM ALBUM COR SERPL-MCNC: 9.1 MG/DL (ref 8.5–10.5)
CALCIUM SERPL-MCNC: 9.6 MG/DL (ref 8.5–10.5)
CHLORIDE SERPL-SCNC: 103 MMOL/L (ref 96–112)
CHOLEST SERPL-MCNC: 252 MG/DL (ref 100–199)
CO2 SERPL-SCNC: 25 MMOL/L (ref 20–33)
CREAT SERPL-MCNC: 0.69 MG/DL (ref 0.5–1.4)
ERYTHROCYTE [DISTWIDTH] IN BLOOD BY AUTOMATED COUNT: 44.4 FL (ref 35.9–50)
GFR SERPLBLD CREATININE-BSD FMLA CKD-EPI: 102 ML/MIN/1.73 M 2
GLOBULIN SER CALC-MCNC: 3 G/DL (ref 1.9–3.5)
GLUCOSE SERPL-MCNC: 93 MG/DL (ref 65–99)
HCT VFR BLD AUTO: 44 % (ref 37–47)
HCV AB SER QL: NORMAL
HDLC SERPL-MCNC: 66 MG/DL
HGB BLD-MCNC: 14.3 G/DL (ref 12–16)
HIV 1+2 AB+HIV1 P24 AG SERPL QL IA: NORMAL
LDLC SERPL CALC-MCNC: 170 MG/DL
MCH RBC QN AUTO: 30.7 PG (ref 27–33)
MCHC RBC AUTO-ENTMCNC: 32.5 G/DL (ref 32.2–35.5)
MCV RBC AUTO: 94.4 FL (ref 81.4–97.8)
PLATELET # BLD AUTO: 216 K/UL (ref 164–446)
PMV BLD AUTO: 12.1 FL (ref 9–12.9)
POTASSIUM SERPL-SCNC: 4.5 MMOL/L (ref 3.6–5.5)
PROT SERPL-MCNC: 7.6 G/DL (ref 6–8.2)
RBC # BLD AUTO: 4.66 M/UL (ref 4.2–5.4)
SODIUM SERPL-SCNC: 139 MMOL/L (ref 135–145)
TRIGL SERPL-MCNC: 78 MG/DL (ref 0–149)
TSH SERPL DL<=0.005 MIU/L-ACNC: 1.79 UIU/ML (ref 0.38–5.33)
WBC # BLD AUTO: 2.5 K/UL (ref 4.8–10.8)

## 2025-06-17 PROCEDURE — 87389 HIV-1 AG W/HIV-1&-2 AB AG IA: CPT

## 2025-06-17 PROCEDURE — 85027 COMPLETE CBC AUTOMATED: CPT

## 2025-06-17 PROCEDURE — 3078F DIAST BP <80 MM HG: CPT

## 2025-06-17 PROCEDURE — 36415 COLL VENOUS BLD VENIPUNCTURE: CPT

## 2025-06-17 PROCEDURE — 86803 HEPATITIS C AB TEST: CPT

## 2025-06-17 PROCEDURE — 84443 ASSAY THYROID STIM HORMONE: CPT

## 2025-06-17 PROCEDURE — 3074F SYST BP LT 130 MM HG: CPT

## 2025-06-17 PROCEDURE — 99396 PREV VISIT EST AGE 40-64: CPT

## 2025-06-17 PROCEDURE — 80053 COMPREHEN METABOLIC PANEL: CPT

## 2025-06-17 PROCEDURE — 80061 LIPID PANEL: CPT

## 2025-06-17 RX ORDER — ONDANSETRON 4 MG/1
4 TABLET, ORALLY DISINTEGRATING ORAL EVERY 6 HOURS PRN
Qty: 20 TABLET | Refills: 0 | Status: SHIPPED | OUTPATIENT
Start: 2025-06-17

## 2025-06-17 ASSESSMENT — PATIENT HEALTH QUESTIONNAIRE - PHQ9
6. FEELING BAD ABOUT YOURSELF - OR THAT YOU ARE A FAILURE OR HAVE LET YOURSELF OR YOUR FAMILY DOWN: NOT AL ALL
7. TROUBLE CONCENTRATING ON THINGS, SUCH AS READING THE NEWSPAPER OR WATCHING TELEVISION: NOT AT ALL
8. MOVING OR SPEAKING SO SLOWLY THAT OTHER PEOPLE COULD HAVE NOTICED. OR THE OPPOSITE, BEING SO FIGETY OR RESTLESS THAT YOU HAVE BEEN MOVING AROUND A LOT MORE THAN USUAL: NOT AT ALL
1. LITTLE INTEREST OR PLEASURE IN DOING THINGS: NOT AT ALL
SUM OF ALL RESPONSES TO PHQ QUESTIONS 1-9: 1
SUM OF ALL RESPONSES TO PHQ9 QUESTIONS 1 AND 2: 0
2. FEELING DOWN, DEPRESSED, IRRITABLE, OR HOPELESS: NOT AT ALL
3. TROUBLE FALLING OR STAYING ASLEEP OR SLEEPING TOO MUCH: SEVERAL DAYS
9. THOUGHTS THAT YOU WOULD BE BETTER OFF DEAD, OR OF HURTING YOURSELF: NOT AT ALL
4. FEELING TIRED OR HAVING LITTLE ENERGY: NOT AT ALL
5. POOR APPETITE OR OVEREATING: NOT AT ALL

## 2025-06-17 NOTE — PROGRESS NOTES
Subjective:     CC:   Chief Complaint   Patient presents with    Annual Exam       HPI:   Kaia Larson is a 55 y.o. female who presents for annual exam.     History of Present Illness  The patient presents for evaluation of cephalalgia, alcohol intake, and stertor.    She reports an increased frequency and duration of headaches, described as mild with phonophobia, lasting 1-2 days, occurring approximately 3 times per month. She has a history of intermittent migraines, which have worsened over the past few months. Her current regimen includes alternating ibuprofen and acetaminophen every few hours until the headache subsides. She avoids Excedrin due to caffeine-induced tremulousness.    The patient expresses concern regarding her increased wine consumption, now 2-3 glasses per night, up from 1 glass, given her family history of alcoholism. She attributes this increase to recent life events, including her daughter's marriage and her 's increased alcohol consumption. She feels overwhelmed by household responsibilities but enjoys engaging in yard work and origami. She is open to couple's therapy and found a previous marriage renewal retreat beneficial.    She reports stertor, as noted by her , who suggested a polysomnography. She experiences daytime somnolence and often naps when sedentary. She is curious if her stertor has worsened with increased alcohol consumption and is interested in exploring alternatives. She acknowledges long-standing dehydration.    She continues to take sertraline.    SOCIAL HISTORY  Drinks 2-3 glasses of wine nightly. Enjoys yard work and paper folding.    FAMILY HISTORY  Family history of alcoholism.        Ob-Gyn/ History:    Patient has GYN provider: no  /Para:    Last Pap Smear:  2018. + history of abnormal pap smears but - HPV.  Gyn Surgery:  None.  Current Contraceptive Method:  None. Is currently sexually active.  Last menstrual period:  2018.     Post-menopausal bleeding: None  Urinary incontinence: None  Folate intake: NA     Health Maintenance  Advanced directive: Counseled   Osteoporosis Screen/ DEXA: NA   PT/vit D for falls prevention: Counseled   Cholesterol Screening: Ordered   Diabetes Screening: Ordered   Aspirin Use: NA      Anticipatory Guidance  Diet: homecooked meals, good protein intake  Exercise: skiing, kids activities, gardening   Substance Abuse: Drinking 2-3 glasses of wine per night, concerned about increase   Safe in relationship.   Seat belts, bike helmet, gun safety discussed.  Sun protection used.    Cancer screening  Colorectal Cancer Screening: Due 11/2025    Lung Cancer Screening: NA    Cervical Cancer Screening: UTD   Breast Cancer Screening: Ordered     Infectious disease screening/Immunizations  --STI Screening: Declines   --Practices safe sex.  --HIV Screening: Ordered   --Hepatitis C Screening: Ordered   --Immunizations:    Influenza: NA    HPV:  NA    Tetanus: UTD    Shingles: Counseled   Pneumococcal : Counseled         Other immunizations: Hep B eligible    She  has a past medical history of Familial tremor (6/14/2017), Lyme disease, and Status post device closure of ASD.  She  has a past surgical history that includes other cardiac surgery.    Family History   Problem Relation Age of Onset    Heart Attack Mother     Cancer Mother 50        breast    Diabetes Mother     Depression Mother        Social History     Socioeconomic History    Marital status:      Spouse name: Not on file    Number of children: Not on file    Years of education: Not on file    Highest education level: Bachelor's degree (e.g., BA, AB, BS)   Occupational History    Not on file   Tobacco Use    Smoking status: Former    Smokeless tobacco: Never    Tobacco comments:     social smoker in the 90s   Vaping Use    Vaping status: Never Used   Substance and Sexual Activity    Alcohol use: Yes     Alcohol/week: 2.4 oz     Types: 4 Glasses of wine  per week     Comment: occ    Drug use: No    Sexual activity: Yes     Partners: Male   Other Topics Concern    Not on file   Social History Narrative    Not on file     Social Drivers of Health     Financial Resource Strain: Low Risk  (5/7/2025)    Overall Financial Resource Strain (CARDIA)     Difficulty of Paying Living Expenses: Not hard at all   Food Insecurity: No Food Insecurity (5/7/2025)    Hunger Vital Sign     Worried About Running Out of Food in the Last Year: Never true     Ran Out of Food in the Last Year: Never true   Transportation Needs: No Transportation Needs (5/7/2025)    PRAPARE - Transportation     Lack of Transportation (Medical): No     Lack of Transportation (Non-Medical): No   Physical Activity: Insufficiently Active (5/7/2025)    Exercise Vital Sign     Days of Exercise per Week: 1 day     Minutes of Exercise per Session: 10 min   Stress: Stress Concern Present (5/7/2025)    [place] [employer] - Occupational Stress Questionnaire     Feeling of Stress : To some extent   Social Connections: Moderately Integrated (5/7/2025)    Social Connection and Isolation Panel [NHANES]     Frequency of Communication with Friends and Family: Once a week     Frequency of Social Gatherings with Friends and Family: Once a week     Attends Je[employer] witness Services: More than 4 times per year     Active Member of Clubs or Organizations: Yes     Attends Club or Organization Meetings: More than 4 times per year     Marital Status:    Intimate Partner Violence: Not on file   Housing Stability: Low Risk  (5/7/2025)    Housing Stability Vital Sign     Unable to Pay for Housing in the Last Year: No     Number of Times Moved in the Last Year: 0     Homeless in the Last Year: No       Patient Active Problem List    Diagnosis Date Noted    Pure hypercholesterolemia 10/27/2022    Neutropenia (HCC) 10/27/2022    Dairy product intolerance 09/26/2022    Recurrent major depressive disorder, in partial  "remission ([facility]) 09/26/2022    Vaginal itching 11/07/2018    Lyme disease 06/14/2017    Familial tremor 06/14/2017    Anxiety 06/14/2017         Current Medications[1]  Allergies[2]    Review of Systems   Constitutional: Negative for fever, chills and malaise/fatigue.   HENT: Negative for congestion.    Eyes: Negative for pain.   Respiratory: Negative for cough and shortness of breath.    Cardiovascular: Negative for leg swelling.   Gastrointestinal: Negative for nausea, vomiting, abdominal pain and diarrhea.   Genitourinary: Negative for dysuria and hematuria.   Skin: Negative for rash.   Neurological: Negative for dizziness, focal weakness + headaches.   Endo/Heme/Allergies: Does not bruise/bleed easily.   Psychiatric/Behavioral: Negative for depression.  The patient is not nervous/anxious.      Objective:     BP 96/62 (BP Location: Left arm, Patient Position: Sitting)   Pulse 67   Temp 36.2 °C (97.1 °F) (Temporal)   Resp 13   Ht 1.651 m (5' 5\")   Wt 53.4 kg (117 lb 12.8 oz)   LMP 06/01/2017   SpO2 99%   BMI 19.60 kg/m²   Body mass index is 19.6 kg/m².  Wt Readings from Last 4 Encounters:   06/17/25 53.4 kg (117 lb 12.8 oz)   06/19/24 50.8 kg (112 lb)   11/09/23 52.2 kg (115 lb)   05/03/23 49.9 kg (110 lb)       Physical Exam:  Constitutional: Well-developed and well-nourished. Not diaphoretic. No distress.   Skin: Skin is warm and dry. No rash noted.  Head: Atraumatic without lesions.  Eyes: Conjunctivae and extraocular motions are normal. Pupils are equal, round, and reactive to light. No scleral icterus.   Ears:  External ears unremarkable. Tympanic membranes clear and intact.  Nose: Nares patent. Septum midline. Turbinates without erythema nor edema. No discharge.   Mouth/Throat: Dentition is intact. Tongue normal. Oropharynx is clear and moist. Posterior pharynx without erythema or exudates.  Neck: Supple, trachea midline. Normal range of motion. No thyromegaly present. No lymphadenopathy--cervical or " supraclavicular.  Cardiovascular: Regular rate and rhythm, S1 and S2 without murmur, rubs, or gallops.  Lungs: Normal inspiratory effort, CTA bilaterally, no wheezes/rhonchi/rales  Breast: Deferred  Abdomen: Soft, non tender, and without distention. Active bowel sounds in all four quadrants. No rebound, guarding, masses or HSM.  : Deferred   Extremities: No cyanosis, clubbing, erythema, nor edema. Distal pulses intact and symmetric.   Musculoskeletal: All major joints AROM full in all directions without pain.  Neurological: Alert and oriented x 3. DTRs 2+/3 and symmetric. No cranial nerve deficit. 5/5 myotomes. Sensation intact.   Psychiatric:  Behavior, mood, and affect are appropriate.    A chaperone was offered to the patient during today's exam. Patient declined chaperone.    Assessment and Plan:     Problem List Items Addressed This Visit       Anxiety    Relevant Medications    ondansetron (ZOFRAN ODT) 4 MG TABLET DISPERSIBLE    sertraline (ZOLOFT) 50 MG Tab    Other Relevant Orders    Referral to Behavioral Health     Other Visit Diagnoses         Wellness examination    -  Primary    Relevant Orders    CBC WITHOUT DIFFERENTIAL    Lipid Profile    TSH WITH REFLEX TO FT4    Comp Metabolic Panel      Screening for HIV (human immunodeficiency virus)        Relevant Orders    HIV AG/AB COMBO ASSAY SCREENING      Need for hepatitis C screening test        Relevant Orders    HEP C VIRUS ANTIBODY      Encounter for screening mammogram for malignant neoplasm of breast        Relevant Orders    MA-SCREENING MAMMO BILAT W/TOMOSYNTHESIS W/CAD      Generalized headaches          Colon cancer screening        Relevant Orders    Cologuard® colon cancer screening          Recommend couples therapy to address underlying feelings of anxiety and overwhelm. Given ideas for alternatives to alcohol. Recommend reduction to see if this improves sleep and snoring. Will follow up if no improvement for additional plan.     HCM:  Up  to date   Labs per orders  Immunizations per orders  Patient counseled about skin care, diet, supplements, prenatal vitamins, safe sex and exercise.      Follow-up: No follow-ups on file.           [1]   Current Outpatient Medications   Medication Sig Dispense Refill    ondansetron (ZOFRAN ODT) 4 MG TABLET DISPERSIBLE Take 1 Tablet by mouth every 6 hours as needed for Nausea/Vomiting. 20 Tablet 0    sertraline (ZOLOFT) 50 MG Tab Take 1 Tablet by mouth every day. 90 Tablet 3    sertraline (ZOLOFT) 50 MG Tab TAKE 1 TABLET BY MOUTH EVERY DAY 90 Tablet 3    hydrOXYzine HCl (ATARAX) 10 MG Tab Take 1 Tablet by mouth at bedtime as needed (sleep). 30 Tablet 0    estrogens, conjugated (PREMARIN) 0.625 MG/GM Cream Insert 0.5 g into the vagina every day. 30 g 2     No current facility-administered medications for this visit.   [2] No Known Allergies

## 2025-06-23 NOTE — Clinical Note
REFERRAL APPROVAL NOTICE         Sent on June 23, 2025                   Kaia Larson  [address] [place] [address]                   Dear Ms. Larson,    After a careful review of the medical information and benefit coverage, [facility] has processed your referral. See below for additional details.    If applicable, you must be actively enrolled with your insurance for coverage of the authorized service. If you have any questions regarding your coverage, please contact your insurance directly.    REFERRAL INFORMATION   Referral #:  [id number]  Referred-To Provider    Referred-By Provider:  Behavioral Health    CHIN Santiago   [facility]      [address]  [facility] [id number] [phone] [address]  [facility] [address]  [phone]    Referral Start Date:  06/17/2025  Referral End Date:   06/17/2026             SCHEDULING  If you do not already have an appointment, please call [phone] to make an appointment.     MORE INFORMATION  If you do not already have a [facility] account, sign up at: [email or web address]  You can access your medical information, make appointments, see lab results, billing information, and more.  If you have questions regarding this referral, please contact  the [facility] Referrals department at:             [phone]. Monday - Friday 8:00AM - 5:00PM.     Sincerely,    [facility]

## 2025-07-01 ENCOUNTER — RESULTS FOLLOW-UP (OUTPATIENT)
Dept: MEDICAL GROUP | Facility: PHYSICIAN GROUP | Age: 55
End: 2025-07-01